# Patient Record
Sex: MALE | Race: WHITE | Employment: FULL TIME | URBAN - METROPOLITAN AREA
[De-identification: names, ages, dates, MRNs, and addresses within clinical notes are randomized per-mention and may not be internally consistent; named-entity substitution may affect disease eponyms.]

---

## 2017-04-13 ENCOUNTER — TELEPHONE (OUTPATIENT)
Dept: INTERNAL MEDICINE CLINIC | Facility: CLINIC | Age: 32
End: 2017-04-13

## 2017-04-13 DIAGNOSIS — C69.90 OCULAR MELANOMA, UNSPECIFIED LATERALITY (HCC): Primary | ICD-10-CM

## 2017-04-15 ENCOUNTER — APPOINTMENT (OUTPATIENT)
Dept: LAB | Facility: HOSPITAL | Age: 32
End: 2017-04-15
Attending: INTERNAL MEDICINE
Payer: COMMERCIAL

## 2017-04-15 DIAGNOSIS — C69.91 OCULAR MELANOMA, RIGHT (HCC): ICD-10-CM

## 2017-04-15 PROCEDURE — 36415 COLL VENOUS BLD VENIPUNCTURE: CPT

## 2017-04-15 PROCEDURE — 80053 COMPREHEN METABOLIC PANEL: CPT

## 2017-04-15 PROCEDURE — 83615 LACTATE (LD) (LDH) ENZYME: CPT

## 2017-04-17 ENCOUNTER — TELEPHONE (OUTPATIENT)
Dept: INTERNAL MEDICINE CLINIC | Facility: CLINIC | Age: 32
End: 2017-04-17

## 2017-04-17 NOTE — TELEPHONE ENCOUNTER
Pt is due for [facility] [place] [facility] wellness exam anytime this calendar year. Discussed in detail w/patient. Appt scheduled for May 8.

## 2017-04-18 NOTE — TELEPHONE ENCOUNTER
Referral is pending status with health plan. Pt will be notified once referral has been approved. Thank you, [employer].

## 2017-04-18 NOTE — TELEPHONE ENCOUNTER
Ref [id number] is an open ref to external opthamologist. Spoke to pt. He has an appt 5/1 to see Dr. Placido Rome in [place] for a ov. Pt has been seeing this specialist for past 3 years for eye cancer. This is a f/u visit. Please approve 2 visits.

## 2017-04-25 ENCOUNTER — OFFICE VISIT (OUTPATIENT)
Dept: HEMATOLOGY/ONCOLOGY | Facility: HOSPITAL | Age: 32
End: 2017-04-25
Attending: INTERNAL MEDICINE
Payer: COMMERCIAL

## 2017-04-25 VITALS
HEART RATE: 81 BPM | HEIGHT: 72 IN | RESPIRATION RATE: 16 BRPM | WEIGHT: 215 LBS | BODY MASS INDEX: 29.12 KG/M2 | DIASTOLIC BLOOD PRESSURE: 83 MMHG | SYSTOLIC BLOOD PRESSURE: 112 MMHG | TEMPERATURE: 98 F

## 2017-04-25 DIAGNOSIS — C69.91 OCULAR MELANOMA, RIGHT (HCC): Primary | ICD-10-CM

## 2017-04-25 PROCEDURE — 99213 OFFICE O/P EST LOW 20 MIN: CPT | Performed by: INTERNAL MEDICINE

## 2017-04-25 PROCEDURE — 99212 OFFICE O/P EST SF 10 MIN: CPT | Performed by: INTERNAL MEDICINE

## 2017-04-25 NOTE — PROGRESS NOTES
HPI       Nilda Whitney is a 28year old male here for f/u of Ocular melanoma, right (hcc)  (primary encounter diagnosis)     Patient here for f/u. Going to [place] for f/u with Optho in a couple of weeks.      He did have a decline in vision in that Current Outpatient Prescriptions:  Atropine Sulfate 1 % Ophthalmic Solution Place 2 drops into the right eye every 7 days.    Disp:  Rfl: 5   prednisoLONE acetate (PRED FORTE) 1 % Ophthalmic Suspension Place 2 drops into the right eye every 7 days Oropharynx is clear and moist.   Eyes: Conjunctivae and EOM are normal. Right pupil is not reactive. Pupils are unequal (R pupil dilated and fixed). Neck: No tracheal deviation present. No thyromegaly present.    Cardiovascular: Normal rate, regular rhyth guidelines. No specific guidelines available for occular melanoma. Will f/u every 6 months with H+P with LFTs. Given that occular melanoma is higher risk than skin, will have MRI of the Abd every year. Surveillance year 3 due in Oct 2017.      D Lymphocytes %          42   Monocytes %          9   Eosinophils %          2   Basophils %          1   Neutrophils Absolute      1.8-7.7 K/UL    3.1   Lymphocytes Absolute      1.0-4.0 K/UL    2.7   Monocytes Absolute      0.0-1.0 K/UL    0.6   Eosinop

## 2017-05-10 ENCOUNTER — OFFICE VISIT (OUTPATIENT)
Dept: INTERNAL MEDICINE CLINIC | Facility: CLINIC | Age: 32
End: 2017-05-10

## 2017-05-10 VITALS
SYSTOLIC BLOOD PRESSURE: 112 MMHG | BODY MASS INDEX: 28.48 KG/M2 | HEIGHT: 72 IN | DIASTOLIC BLOOD PRESSURE: 74 MMHG | WEIGHT: 210.31 LBS | HEART RATE: 106 BPM

## 2017-05-10 DIAGNOSIS — Z00.00 ANNUAL PHYSICAL EXAM: Primary | ICD-10-CM

## 2017-05-10 PROCEDURE — G0438 PPPS, INITIAL VISIT: HCPCS | Performed by: INTERNAL MEDICINE

## 2017-05-10 PROCEDURE — 99395 PREV VISIT EST AGE 18-39: CPT | Performed by: INTERNAL MEDICINE

## 2017-05-10 NOTE — PROGRESS NOTES
HPI:    Patient ID: Mikey White is a 28year old male. HPI     Annual Physical Exam  Patient with PMHx ocular melanoma presents for an annual physical exam. Patient states he has one radiation treatment left at [facility]. Denies CP or SOB on exertion.  P normal.   Left Ear: Hearing and external ear normal.   Nose: Nose normal.   Mouth/Throat: Oropharynx is clear and moist. No oropharyngeal exudate. Eyes: Conjunctivae are normal.   Neck: Normal range of motion. Neck supple.    Cardiovascular: Normal rate, performance and is accurate and complete.   Chantal Gould MD, 5/10/2017, 5:22 PM

## 2017-10-21 ENCOUNTER — HOSPITAL ENCOUNTER (OUTPATIENT)
Dept: MRI IMAGING | Facility: HOSPITAL | Age: 32
Discharge: HOME | End: 2017-10-21
Attending: INTERNAL MEDICINE
Payer: COMMERCIAL

## 2017-10-21 ENCOUNTER — APPOINTMENT (OUTPATIENT)
Dept: LAB | Facility: HOSPITAL | Age: 32
End: 2017-10-21
Attending: INTERNAL MEDICINE
Payer: COMMERCIAL

## 2017-10-21 DIAGNOSIS — C69.91 OCULAR MELANOMA, RIGHT (HCC): ICD-10-CM

## 2017-10-21 PROCEDURE — 80053 COMPREHEN METABOLIC PANEL: CPT

## 2017-10-21 PROCEDURE — 74183 MRI ABD W/O CNTR FLWD CNTR: CPT | Performed by: INTERNAL MEDICINE

## 2017-10-21 PROCEDURE — 82565 ASSAY OF CREATININE: CPT

## 2017-10-21 PROCEDURE — 83615 LACTATE (LD) (LDH) ENZYME: CPT

## 2017-10-21 PROCEDURE — A9575 INJ GADOTERATE MEGLUMI 0.1ML: HCPCS | Performed by: INTERNAL MEDICINE

## 2017-10-21 PROCEDURE — 36415 COLL VENOUS BLD VENIPUNCTURE: CPT

## 2017-11-07 ENCOUNTER — OFFICE VISIT (OUTPATIENT)
Dept: HEMATOLOGY/ONCOLOGY | Facility: HOSPITAL | Age: 32
End: 2017-11-07
Attending: INTERNAL MEDICINE
Payer: COMMERCIAL

## 2017-11-07 VITALS
HEART RATE: 71 BPM | WEIGHT: 212 LBS | RESPIRATION RATE: 16 BRPM | DIASTOLIC BLOOD PRESSURE: 74 MMHG | SYSTOLIC BLOOD PRESSURE: 115 MMHG | HEIGHT: 72 IN | BODY MASS INDEX: 28.71 KG/M2 | TEMPERATURE: 98 F

## 2017-11-07 DIAGNOSIS — C69.91 MALIGNANT MELANOMA OF RIGHT EYE (HCC): Primary | ICD-10-CM

## 2017-11-07 PROCEDURE — 99213 OFFICE O/P EST LOW 20 MIN: CPT | Performed by: INTERNAL MEDICINE

## 2017-11-07 PROCEDURE — 99212 OFFICE O/P EST SF 10 MIN: CPT | Performed by: INTERNAL MEDICINE

## 2017-11-07 NOTE — PROGRESS NOTES
HPI       Giuseppe Santamaria is a 33year old male here for f/u of Malignant melanoma of right eye (hcc)  (primary encounter diagnosis)     Patient here for f/u. Going to [place] for f/u with Optho in a month.      He did have a decline in vision in that e Prescriptions:  Atropine Sulfate 1 % Ophthalmic Solution Place 2 drops into the right eye every 7 days. Disp:  Rfl: 5   prednisoLONE acetate (PRED FORTE) 1 % Ophthalmic Suspension Place 2 drops into the right eye every 7 days.    Disp:  Rfl: 5     Allergi Ear: External ear normal.   Nose: Nose normal.   Mouth/Throat: Oropharynx is clear and moist.   Eyes: Conjunctivae and EOM are normal. Right pupil is not reactive. Pupils are unequal (R pupil dilated and fixed). Neck: No tracheal deviation present.  No th [place]. Surveillance as per NCCN guidelines. No specific guidelines available for occular melanoma. Will f/u every 6 months with H+P with LFTs. Given that occular melanoma is higher risk than skin, will have MRI of the Abd every year.   Yuli Devlin Intraluminal layering intrinsic T1 hyperintense contents may reflect concentrated bile. No intrahepatic or extrahepatic biliary dilatation is apparent. PANCREAS:      There is normal signal intensity.   No focal mass or main pancreatic duct dilatation is 1. 04 0.81   CALCIUM      8.5 - 10.5 mg/dL 9.4 9.5   ALT (SGPT)      17 - 63 U/L 25 32   AST (SGOT)      15 - 41 U/L 20 20   ALKALINE PHOSPHATASE      32 - 100 U/L 43 51   Total Bilirubin      0.3 - 1.2 mg/dL 1.1 0.9   TOTAL PROTEIN      5.9 - 8.4 g/dL 7.2

## 2017-11-10 ENCOUNTER — OFFICE VISIT (OUTPATIENT)
Dept: INTERNAL MEDICINE CLINIC | Facility: CLINIC | Age: 32
End: 2017-11-10

## 2017-11-10 VITALS
SYSTOLIC BLOOD PRESSURE: 114 MMHG | BODY MASS INDEX: 29.39 KG/M2 | HEIGHT: 72 IN | WEIGHT: 217 LBS | DIASTOLIC BLOOD PRESSURE: 74 MMHG | HEART RATE: 56 BPM

## 2017-11-10 DIAGNOSIS — H54.61 VISION LOSS OF RIGHT EYE: ICD-10-CM

## 2017-11-10 DIAGNOSIS — C69.91 MALIGNANT MELANOMA OF RIGHT EYE (HCC): Primary | ICD-10-CM

## 2017-11-10 PROCEDURE — 99212 OFFICE O/P EST SF 10 MIN: CPT | Performed by: INTERNAL MEDICINE

## 2017-11-10 PROCEDURE — 99213 OFFICE O/P EST LOW 20 MIN: CPT | Performed by: INTERNAL MEDICINE

## 2017-11-10 NOTE — PATIENT INSTRUCTIONS
ASSESSMENT/PLAN:   Malignant melanoma of right eye (hcc)  (primary encounter diagnosis)- in remission, follows with eye specialist in [place] and  Dr chan here at the He/Onc clinic  Vision loss of right eye- due to above

## 2017-11-10 NOTE — PROGRESS NOTES
HPI:    Patient ID: Candida Hunter is a 33year old male. HPI  pt comes in today for the first time in my office to establish care. Pt has his of occular Melanoma in remission has had treatment and surgery at [facility] in [place].  Follows with oncology   COMPLETE      Comment: Scanned to media tab 09-   Family History   Problem Relation Age of Onset   • Migraines Mother    • Breast Cancer Maternal Grandmother 76   • Glaucoma Neg    • Macular degeneration Neg    • Diabetes Neg       Social History: S prescriptions requested or ordered in this encounter    Imaging & Referrals:  None        #[id number]

## 2017-11-17 ENCOUNTER — TELEPHONE (OUTPATIENT)
Dept: INTERNAL MEDICINE CLINIC | Facility: CLINIC | Age: 32
End: 2017-11-17

## 2017-11-17 NOTE — TELEPHONE ENCOUNTER
Patient is following up on test results wants to know if you have received them. Also needing referral for Dr Risa Hamman in [place] referral from April all 3 appointments used. Has appointment December 11th.

## 2017-11-20 DIAGNOSIS — C69.91 MALIGNANT MELANOMA OF RIGHT EYE (HCC): Primary | ICD-10-CM

## 2018-05-18 ENCOUNTER — TELEPHONE (OUTPATIENT)
Dept: INTERNAL MEDICINE CLINIC | Facility: CLINIC | Age: 33
End: 2018-05-18

## 2018-05-18 DIAGNOSIS — C69.91 MALIGNANT MELANOMA OF RIGHT EYE (HCC): Primary | ICD-10-CM

## 2018-05-25 ENCOUNTER — APPOINTMENT (OUTPATIENT)
Dept: LAB | Facility: HOSPITAL | Age: 33
End: 2018-05-25
Attending: INTERNAL MEDICINE
Payer: COMMERCIAL

## 2018-05-25 DIAGNOSIS — C69.91 MALIGNANT MELANOMA OF RIGHT EYE (HCC): ICD-10-CM

## 2018-05-25 PROCEDURE — 80053 COMPREHEN METABOLIC PANEL: CPT

## 2018-05-25 PROCEDURE — 36415 COLL VENOUS BLD VENIPUNCTURE: CPT

## 2018-05-25 PROCEDURE — 83615 LACTATE (LD) (LDH) ENZYME: CPT

## 2018-06-04 ENCOUNTER — TELEPHONE (OUTPATIENT)
Dept: INTERNAL MEDICINE CLINIC | Facility: CLINIC | Age: 33
End: 2018-06-04

## 2018-06-04 DIAGNOSIS — C69.91 MALIGNANT MELANOMA OF RIGHT EYE (HCC): Primary | ICD-10-CM

## 2018-06-05 ENCOUNTER — OFFICE VISIT (OUTPATIENT)
Dept: HEMATOLOGY/ONCOLOGY | Facility: HOSPITAL | Age: 33
End: 2018-06-05
Attending: INTERNAL MEDICINE
Payer: COMMERCIAL

## 2018-06-05 ENCOUNTER — APPOINTMENT (OUTPATIENT)
Dept: LAB | Facility: HOSPITAL | Age: 33
End: 2018-06-05
Attending: INTERNAL MEDICINE
Payer: COMMERCIAL

## 2018-06-05 VITALS
BODY MASS INDEX: 29.26 KG/M2 | HEIGHT: 72 IN | HEART RATE: 72 BPM | WEIGHT: 216 LBS | SYSTOLIC BLOOD PRESSURE: 116 MMHG | TEMPERATURE: 98 F | RESPIRATION RATE: 16 BRPM | DIASTOLIC BLOOD PRESSURE: 63 MMHG

## 2018-06-05 DIAGNOSIS — C69.91 MALIGNANT MELANOMA OF RIGHT EYE (HCC): ICD-10-CM

## 2018-06-05 DIAGNOSIS — R17 SERUM TOTAL BILIRUBIN ELEVATED: Primary | ICD-10-CM

## 2018-06-05 DIAGNOSIS — R17 SERUM TOTAL BILIRUBIN ELEVATED: ICD-10-CM

## 2018-06-05 PROCEDURE — 36415 COLL VENOUS BLD VENIPUNCTURE: CPT

## 2018-06-05 PROCEDURE — 80076 HEPATIC FUNCTION PANEL: CPT

## 2018-06-05 PROCEDURE — 99214 OFFICE O/P EST MOD 30 MIN: CPT | Performed by: INTERNAL MEDICINE

## 2018-06-05 NOTE — PROGRESS NOTES
STELLA       Omar Smith is a 33year old male here for f/u of Malignant melanoma of right eye (hcc)     Patient here for f/u. Going to [place] for f/u with Optho in a month. Going every 6 months. He did have a decline in vision in that eye.   L eye Outpatient Prescriptions:  Atropine Sulfate 1 % Ophthalmic Solution Place 2 drops into the right eye every 7 days. Disp:  Rfl: 5   prednisoLONE acetate (PRED FORTE) 1 % Ophthalmic Suspension Place 2 drops into the right eye every 7 days.    Disp:  Rfl: 5 Ear: External ear normal.   Nose: Nose normal.   Mouth/Throat: Oropharynx is clear and moist.   Eyes: Conjunctivae and EOM are normal. Right pupil is not reactive. Pupils are unequal (R pupil dilated and fixed). Neck: No tracheal deviation present.  No th NCCN guidelines. No specific guidelines available for occular melanoma. Will f/u every 6 months with H+P with LFTs. Slight elevation of his bilirubin, he had this back in 2015 and repeated and back to normal.  Will have repeat LFTs today.     Given t Non-      >=60 >60 >60 >60   GFR, -American      >=60 >60 >60 >60   Patient Fasting?        Yes     LDH      98 - 192 U/L 120 144 126

## 2018-11-17 ENCOUNTER — APPOINTMENT (OUTPATIENT)
Dept: LAB | Facility: HOSPITAL | Age: 33
End: 2018-11-17
Attending: INTERNAL MEDICINE
Payer: COMMERCIAL

## 2018-11-17 ENCOUNTER — HOSPITAL ENCOUNTER (OUTPATIENT)
Dept: MRI IMAGING | Facility: HOSPITAL | Age: 33
Discharge: HOME | End: 2018-11-17
Attending: INTERNAL MEDICINE
Payer: COMMERCIAL

## 2018-11-17 ENCOUNTER — TELEPHONE (OUTPATIENT)
Dept: HEMATOLOGY/ONCOLOGY | Facility: HOSPITAL | Age: 33
End: 2018-11-17

## 2018-11-17 DIAGNOSIS — C69.90 MALIGNANT MELANOMA OF EYE, UNSPECIFIED LATERALITY (HCC): Primary | ICD-10-CM

## 2018-11-17 DIAGNOSIS — C69.91 MALIGNANT MELANOMA OF RIGHT EYE (HCC): ICD-10-CM

## 2018-11-17 DIAGNOSIS — C69.90 MALIGNANT MELANOMA OF EYE, UNSPECIFIED LATERALITY (HCC): ICD-10-CM

## 2018-11-17 PROCEDURE — 80053 COMPREHEN METABOLIC PANEL: CPT

## 2018-11-17 PROCEDURE — 74183 MRI ABD W/O CNTR FLWD CNTR: CPT | Performed by: INTERNAL MEDICINE

## 2018-11-17 PROCEDURE — 36415 COLL VENOUS BLD VENIPUNCTURE: CPT

## 2018-11-17 PROCEDURE — A9575 INJ GADOTERATE MEGLUMI 0.1ML: HCPCS | Performed by: INTERNAL MEDICINE

## 2018-11-17 PROCEDURE — 82565 ASSAY OF CREATININE: CPT

## 2018-11-17 PROCEDURE — 83615 LACTATE (LD) (LDH) ENZYME: CPT

## 2018-11-17 NOTE — TELEPHONE ENCOUNTER
Patient and his wife called very upset and frustrated that lab orders were not available to lab personal this morning when he went for labs. Pt's standing orders had .  His appt is ten days from now on the , but pt and wife are angry as they nathalie

## 2018-11-26 DIAGNOSIS — C69.91 MALIGNANT MELANOMA OF RIGHT EYE (HCC): Primary | ICD-10-CM

## 2018-11-27 ENCOUNTER — OFFICE VISIT (OUTPATIENT)
Dept: HEMATOLOGY/ONCOLOGY | Facility: HOSPITAL | Age: 33
End: 2018-11-27
Attending: INTERNAL MEDICINE
Payer: COMMERCIAL

## 2018-11-27 VITALS
WEIGHT: 222 LBS | SYSTOLIC BLOOD PRESSURE: 126 MMHG | DIASTOLIC BLOOD PRESSURE: 62 MMHG | TEMPERATURE: 98 F | RESPIRATION RATE: 16 BRPM | HEART RATE: 77 BPM | BODY MASS INDEX: 30.07 KG/M2 | HEIGHT: 72 IN

## 2018-11-27 DIAGNOSIS — C69.91 MALIGNANT MELANOMA OF RIGHT EYE (HCC): ICD-10-CM

## 2018-11-27 PROCEDURE — 99213 OFFICE O/P EST LOW 20 MIN: CPT | Performed by: INTERNAL MEDICINE

## 2018-11-27 NOTE — PROGRESS NOTES
STELLA       Diamante Quintanilla is a 29year old male here for f/u of Malignant melanoma of right eye (hcc)     Patient here for f/u. Going to [place] for f/u with Optho yearly now in June. He did have a decline in vision in that eye.   L eye vision is go Laterality Date   • ELECTROCARDIOGRAM, COMPLETE  09-    Scanned to media tab 09-     Social History    Socioeconomic History      Marital status:       Spouse name: Not on file      Number of children: Not on file      Years of educati 95.4 kg (210 lb 4.8 oz)  04/25/17 : 97.5 kg (215 lb)    PHYSICAL EXAM:    Physical Exam   Constitutional: He appears well-developed and well-nourished. HENT:   Head: Normocephalic and atraumatic.    Right Ear: External ear normal.   Left Ear: External ear as per NCCN guidelines. No specific guidelines available for occular melanoma. 5 yrs post treatment. Given that occular melanoma is higher risk than skin, will have MRI of the Abd every year. Now on surveillance every 12 months.     Dermatol [address], 10/10/2015, 8:50. [facility], [facility], [facility] 59, 10/18/2014, 11:22. [facility] 59, 9/06/2013, 11:39.   [address] [place] [address] Dr Golden, MRI ABD

## 2018-12-04 ENCOUNTER — APPOINTMENT (OUTPATIENT)
Dept: HEMATOLOGY/ONCOLOGY | Facility: HOSPITAL | Age: 33
End: 2018-12-04
Attending: INTERNAL MEDICINE
Payer: COMMERCIAL

## 2019-05-17 ENCOUNTER — TELEPHONE (OUTPATIENT)
Dept: INTERNAL MEDICINE CLINIC | Facility: CLINIC | Age: 34
End: 2019-05-17

## 2019-05-17 DIAGNOSIS — C69.91: Primary | ICD-10-CM

## 2019-05-17 NOTE — TELEPHONE ENCOUNTER
Patient is asking for a referral to see Dr. Shaunna Heredia eye doctor  for check up follow and imaging

## 2019-05-20 NOTE — TELEPHONE ENCOUNTER
Spoke to patient to inform him that referral is ready. Patient states to pick it up from [address] [place] [address] office tomorrow.

## 2019-05-28 ENCOUNTER — TELEPHONE (OUTPATIENT)
Dept: INTERNAL MEDICINE CLINIC | Facility: CLINIC | Age: 34
End: 2019-05-28

## 2019-05-28 NOTE — TELEPHONE ENCOUNTER
Called Edmond Alaniz, she stated to sent most recent office visit from PCP.      Verbally understood, will sent most recent visit with pcp    Shin

## 2019-05-28 NOTE — TELEPHONE ENCOUNTER
Per Je Medley referred to an eye doctor not in network, why ?   can patient see in network provider?

## 2019-05-28 NOTE — TELEPHONE ENCOUNTER
Received referral request [facility]#[id number] , can we use an in network provider?     Thanks,    Jayy Wilosn

## 2019-06-27 ENCOUNTER — MED REC SCAN ONLY (OUTPATIENT)
Dept: INTERNAL MEDICINE CLINIC | Facility: CLINIC | Age: 34
End: 2019-06-27

## 2019-10-19 ENCOUNTER — APPOINTMENT (OUTPATIENT)
Dept: LAB | Facility: HOSPITAL | Age: 34
End: 2019-10-19
Attending: INTERNAL MEDICINE
Payer: COMMERCIAL

## 2019-10-19 ENCOUNTER — HOSPITAL ENCOUNTER (OUTPATIENT)
Dept: MRI IMAGING | Facility: HOSPITAL | Age: 34
Discharge: HOME | End: 2019-10-19
Attending: INTERNAL MEDICINE
Payer: COMMERCIAL

## 2019-10-19 DIAGNOSIS — C69.91 MALIGNANT MELANOMA OF RIGHT EYE (HCC): ICD-10-CM

## 2019-10-19 PROCEDURE — 83615 LACTATE (LD) (LDH) ENZYME: CPT

## 2019-10-19 PROCEDURE — 82565 ASSAY OF CREATININE: CPT

## 2019-10-19 PROCEDURE — A9575 INJ GADOTERATE MEGLUMI 0.1ML: HCPCS | Performed by: INTERNAL MEDICINE

## 2019-10-19 PROCEDURE — 80053 COMPREHEN METABOLIC PANEL: CPT

## 2019-10-19 PROCEDURE — 36415 COLL VENOUS BLD VENIPUNCTURE: CPT

## 2019-10-19 PROCEDURE — 74183 MRI ABD W/O CNTR FLWD CNTR: CPT | Performed by: INTERNAL MEDICINE

## 2019-11-25 DIAGNOSIS — C69.91 MALIGNANT MELANOMA OF RIGHT EYE (HCC): Primary | ICD-10-CM

## 2019-11-26 ENCOUNTER — OFFICE VISIT (OUTPATIENT)
Dept: HEMATOLOGY/ONCOLOGY | Facility: HOSPITAL | Age: 34
End: 2019-11-26
Attending: INTERNAL MEDICINE
Payer: COMMERCIAL

## 2019-11-26 VITALS
HEIGHT: 72 IN | SYSTOLIC BLOOD PRESSURE: 114 MMHG | RESPIRATION RATE: 16 BRPM | BODY MASS INDEX: 30.88 KG/M2 | HEART RATE: 69 BPM | DIASTOLIC BLOOD PRESSURE: 67 MMHG | OXYGEN SATURATION: 96 % | TEMPERATURE: 98 F | WEIGHT: 228 LBS

## 2019-11-26 DIAGNOSIS — C69.91 MALIGNANT MELANOMA OF RIGHT EYE (HCC): Primary | ICD-10-CM

## 2019-11-26 PROCEDURE — 99213 OFFICE O/P EST LOW 20 MIN: CPT | Performed by: INTERNAL MEDICINE

## 2019-11-26 NOTE — PROGRESS NOTES
STELLA     Luther Krabbe is a 36year old male here for f/u of Malignant melanoma of right eye (hcc)  (primary encounter diagnosis)     Patient here for f/u. Going to [place] for f/u with Optho yearly now in June. Told no further need for eye drops. Grandmother 68   • Glaucoma Neg    • Macular degeneration Neg    • Diabetes Neg        /67 (BP Location: Left arm, Patient Position: Sitting, Cuff Size: large)   Pulse 69   Temp 98.3 °F (36.8 °C) (Oral)   Resp 16   Ht 1.829 m (6')   Wt 103.4 kg (228 encounter. Results From Past 48 Hours:  No results found for this or any previous visit (from the past 48 hour(s)). Imaging & Referrals:  None   No orders of the defined types were placed in this encounter.      Component      Latest Ref Rng & Unit suspected pathology. Images were obtained both before and after intravenous gadolinium   injection. FINDINGS:          LUNG BASES:  No significant signal abnormality is identified. LIVER:  No focal hepatic mass is seen.   There is no hepatic steat

## 2019-11-27 ENCOUNTER — TELEPHONE (OUTPATIENT)
Dept: INTERNAL MEDICINE CLINIC | Facility: CLINIC | Age: 34
End: 2019-11-27

## 2019-11-27 DIAGNOSIS — C69.91 MALIGNANT MELANOMA OF RIGHT EYE (HCC): Primary | ICD-10-CM

## 2019-12-06 ENCOUNTER — TELEPHONE (OUTPATIENT)
Dept: INTERNAL MEDICINE CLINIC | Facility: CLINIC | Age: 34
End: 2019-12-06

## 2019-12-06 NOTE — TELEPHONE ENCOUNTER
Spoke to patient, he will call back to schedule an appointment for a follow up with Dr. Elsa Mcpherson

## 2019-12-12 ENCOUNTER — OFFICE VISIT (OUTPATIENT)
Dept: INTERNAL MEDICINE CLINIC | Facility: CLINIC | Age: 34
End: 2019-12-12

## 2019-12-12 VITALS
WEIGHT: 226 LBS | HEART RATE: 64 BPM | DIASTOLIC BLOOD PRESSURE: 79 MMHG | HEIGHT: 72 IN | BODY MASS INDEX: 30.61 KG/M2 | SYSTOLIC BLOOD PRESSURE: 120 MMHG | OXYGEN SATURATION: 98 % | TEMPERATURE: 98 F | RESPIRATION RATE: 17 BRPM

## 2019-12-12 DIAGNOSIS — R05.9 COUGH: ICD-10-CM

## 2019-12-12 DIAGNOSIS — H54.61 VISION LOSS OF RIGHT EYE: ICD-10-CM

## 2019-12-12 DIAGNOSIS — B00.1 HERPES LABIALIS: ICD-10-CM

## 2019-12-12 DIAGNOSIS — Z00.00 ANNUAL PHYSICAL EXAM: Primary | ICD-10-CM

## 2019-12-12 DIAGNOSIS — C69.91 MALIGNANT MELANOMA OF RIGHT EYE (HCC): ICD-10-CM

## 2019-12-12 DIAGNOSIS — J02.9 SORE THROAT: ICD-10-CM

## 2019-12-12 PROCEDURE — 99395 PREV VISIT EST AGE 18-39: CPT | Performed by: INTERNAL MEDICINE

## 2019-12-12 RX ORDER — AZITHROMYCIN 250 MG/1
TABLET, FILM COATED ORAL
Qty: 6 TABLET | Refills: 0 | Status: SHIPPED | OUTPATIENT
Start: 2019-12-12 | End: 2021-09-29

## 2019-12-12 RX ORDER — BENZONATATE 100 MG/1
100 CAPSULE ORAL 3 TIMES DAILY PRN
Qty: 20 CAPSULE | Refills: 0 | Status: SHIPPED | OUTPATIENT
Start: 2019-12-12 | End: 2019-12-19

## 2019-12-12 RX ORDER — VALACYCLOVIR HYDROCHLORIDE 1 G/1
TABLET, FILM COATED ORAL
Qty: 30 TABLET | Refills: 2 | Status: SHIPPED | OUTPATIENT
Start: 2019-12-12 | End: 2021-09-29

## 2019-12-12 NOTE — PROGRESS NOTES
HPI:    Patient ID: Wendy Dunne is a 36year old male. HPI  Patient comes in for annual physical exam.  Is been sick for a week now sore throat cough congestion not getting better.   Patient with history of right thigh malignant melanoma with vision lo Ocular melanoma ([facility] [address] 09/06/2013   • Retinal detachment 2013    Macula -on bullous retinal detachment , OD   • Torn ACL       Past Surgical History:   Procedure Laterality Date   • ELECTROCARDIOGRAM, COMPLETE  09-    Scanned to media tab 09- hematology oncology is [facility] also will refer to Derm  Vision loss of right eye- as above  Sore throat- will cover with ab, take as prescribed   Cough - will add cough med   Herpes labialis- take the med 1 tab bid  2 days  as need at he first

## 2020-11-17 ENCOUNTER — APPOINTMENT (OUTPATIENT)
Dept: HEMATOLOGY/ONCOLOGY | Facility: HOSPITAL | Age: 35
End: 2020-11-17
Attending: INTERNAL MEDICINE
Payer: COMMERCIAL

## 2021-03-17 ENCOUNTER — OFFICE VISIT (OUTPATIENT)
Dept: INTERNAL MEDICINE CLINIC | Facility: CLINIC | Age: 36
End: 2021-03-17

## 2021-03-17 VITALS
SYSTOLIC BLOOD PRESSURE: 112 MMHG | BODY MASS INDEX: 32.73 KG/M2 | HEART RATE: 83 BPM | OXYGEN SATURATION: 98 % | DIASTOLIC BLOOD PRESSURE: 74 MMHG | WEIGHT: 255 LBS | TEMPERATURE: 98 F | HEIGHT: 74 IN

## 2021-03-17 DIAGNOSIS — Z00.00 ANNUAL PHYSICAL EXAM: Primary | ICD-10-CM

## 2021-03-17 DIAGNOSIS — H54.61 VISION LOSS OF RIGHT EYE: ICD-10-CM

## 2021-03-17 DIAGNOSIS — R79.89 ELEVATED LFTS: ICD-10-CM

## 2021-03-17 DIAGNOSIS — C69.91 MALIGNANT MELANOMA OF RIGHT EYE (HCC): ICD-10-CM

## 2021-03-17 LAB
ALBUMIN SERPL-MCNC: 4.8 G/DL (ref 3.4–5)
ALBUMIN/GLOB SERPL: 1.5 {RATIO} (ref 1–2)
ALP LIVER SERPL-CCNC: 51 U/L
ALT SERPL-CCNC: 128 U/L
ANION GAP SERPL CALC-SCNC: 4 MMOL/L (ref 0–18)
AST SERPL-CCNC: 40 U/L (ref 15–37)
BASOPHILS # BLD AUTO: 0.06 X10(3) UL (ref 0–0.2)
BASOPHILS NFR BLD AUTO: 0.8 %
BILIRUB SERPL-MCNC: 0.9 MG/DL (ref 0.1–2)
BILIRUB UR QL: NEGATIVE
BUN BLD-MCNC: 16 MG/DL (ref 7–18)
BUN/CREAT SERPL: 15.1 (ref 10–20)
CALCIUM BLD-MCNC: 9.5 MG/DL (ref 8.5–10.1)
CHLORIDE SERPL-SCNC: 105 MMOL/L (ref 98–112)
CHOLEST SMN-MCNC: 223 MG/DL (ref ?–200)
CLARITY UR: CLEAR
CO2 SERPL-SCNC: 29 MMOL/L (ref 21–32)
COLOR UR: YELLOW
CREAT BLD-MCNC: 1.06 MG/DL
DEPRECATED RDW RBC AUTO: 36.2 FL (ref 35.1–46.3)
EOSINOPHIL # BLD AUTO: 0.17 X10(3) UL (ref 0–0.7)
EOSINOPHIL NFR BLD AUTO: 2.3 %
ERYTHROCYTE [DISTWIDTH] IN BLOOD BY AUTOMATED COUNT: 12 % (ref 11–15)
GLOBULIN PLAS-MCNC: 3.3 G/DL (ref 2.8–4.4)
GLUCOSE BLD-MCNC: 89 MG/DL (ref 70–99)
GLUCOSE UR-MCNC: NEGATIVE MG/DL
HCT VFR BLD AUTO: 47.1 %
HDLC SERPL-MCNC: 26 MG/DL (ref 40–59)
HGB BLD-MCNC: 16.3 G/DL
HGB UR QL STRIP.AUTO: NEGATIVE
IMM GRANULOCYTES # BLD AUTO: 0.03 X10(3) UL (ref 0–1)
IMM GRANULOCYTES NFR BLD: 0.4 %
KETONES UR-MCNC: NEGATIVE MG/DL
LDH SERPL L TO P-CCNC: 198 U/L
LDLC SERPL CALC-MCNC: 145 MG/DL (ref ?–100)
LEUKOCYTE ESTERASE UR QL STRIP.AUTO: NEGATIVE
LYMPHOCYTES # BLD AUTO: 2.91 X10(3) UL (ref 1–4)
LYMPHOCYTES NFR BLD AUTO: 39.7 %
M PROTEIN MFR SERPL ELPH: 8.1 G/DL (ref 6.4–8.2)
MCH RBC QN AUTO: 29 PG (ref 26–34)
MCHC RBC AUTO-ENTMCNC: 34.6 G/DL (ref 31–37)
MCV RBC AUTO: 83.7 FL
MONOCYTES # BLD AUTO: 0.73 X10(3) UL (ref 0.1–1)
MONOCYTES NFR BLD AUTO: 10 %
NEUTROPHILS # BLD AUTO: 3.43 X10 (3) UL (ref 1.5–7.7)
NEUTROPHILS # BLD AUTO: 3.43 X10(3) UL (ref 1.5–7.7)
NEUTROPHILS NFR BLD AUTO: 46.8 %
NITRITE UR QL STRIP.AUTO: NEGATIVE
NONHDLC SERPL-MCNC: 197 MG/DL (ref ?–130)
OSMOLALITY SERPL CALC.SUM OF ELEC: 287 MOSM/KG (ref 275–295)
PATIENT FASTING Y/N/NP: YES
PATIENT FASTING Y/N/NP: YES
PH UR: 6 [PH] (ref 5–8)
PLATELET # BLD AUTO: 294 10(3)UL (ref 150–450)
POTASSIUM SERPL-SCNC: 4 MMOL/L (ref 3.5–5.1)
PROT UR-MCNC: NEGATIVE MG/DL
RBC # BLD AUTO: 5.63 X10(6)UL
SODIUM SERPL-SCNC: 138 MMOL/L (ref 136–145)
SP GR UR STRIP: 1.02 (ref 1–1.03)
TRIGL SERPL-MCNC: 260 MG/DL (ref 30–149)
TSI SER-ACNC: 1.73 MIU/ML (ref 0.36–3.74)
UROBILINOGEN UR STRIP-ACNC: <2
VLDLC SERPL CALC-MCNC: 52 MG/DL (ref 0–30)
WBC # BLD AUTO: 7.3 X10(3) UL (ref 4–11)

## 2021-03-17 PROCEDURE — 3078F DIAST BP <80 MM HG: CPT | Performed by: INTERNAL MEDICINE

## 2021-03-17 PROCEDURE — 36415 COLL VENOUS BLD VENIPUNCTURE: CPT | Performed by: INTERNAL MEDICINE

## 2021-03-17 PROCEDURE — 3008F BODY MASS INDEX DOCD: CPT | Performed by: INTERNAL MEDICINE

## 2021-03-17 PROCEDURE — 99395 PREV VISIT EST AGE 18-39: CPT | Performed by: INTERNAL MEDICINE

## 2021-03-17 PROCEDURE — G0438 PPPS, INITIAL VISIT: HCPCS | Performed by: INTERNAL MEDICINE

## 2021-03-17 PROCEDURE — 3074F SYST BP LT 130 MM HG: CPT | Performed by: INTERNAL MEDICINE

## 2021-03-18 LAB
DEPRECATED HBV CORE AB SER IA-ACNC: 390.4 NG/ML
TRANSFERRIN SERPL-MCNC: 244 MG/DL (ref 200–360)

## 2021-03-20 NOTE — PROGRESS NOTES
HPI/Subjective:     Patient ID: Madelin Malhotra is a 30year old male. HPI  Patient comes in today for annual physical overall doing okay denies any complaints. History/Other:   Review of Systems   Constitutional: Negative.   Negative for fatigue and feve degeneration Neg    • Diabetes Neg       Social History: Social History    Tobacco Use      Smoking status: Never Smoker      Smokeless tobacco: Never Used    Alcohol use: Yes      Comment: socially (no per NG ECD)    Drug use: No       Objective:   Physic prescriptions requested or ordered in this encounter       Imaging & Referrals:  ONCOLOGY/HEMATOLOGY - INTERNAL

## 2021-03-26 ENCOUNTER — IMMUNIZATION (OUTPATIENT)
Dept: LAB | Age: 36
End: 2021-03-26
Attending: HOSPITALIST
Payer: COMMERCIAL

## 2021-03-26 DIAGNOSIS — Z23 NEED FOR VACCINATION: Primary | ICD-10-CM

## 2021-03-26 PROCEDURE — 0001A SARSCOV2 VAC 30MCG/0.3ML IM: CPT

## 2021-04-16 ENCOUNTER — IMMUNIZATION (OUTPATIENT)
Dept: LAB | Age: 36
End: 2021-04-16
Attending: HOSPITALIST
Payer: COMMERCIAL

## 2021-04-16 DIAGNOSIS — Z23 NEED FOR VACCINATION: Primary | ICD-10-CM

## 2021-04-16 PROCEDURE — 0002A SARSCOV2 VAC 30MCG/0.3ML IM: CPT

## 2021-04-27 ENCOUNTER — TELEPHONE (OUTPATIENT)
Dept: INTERNAL MEDICINE CLINIC | Facility: CLINIC | Age: 36
End: 2021-04-27

## 2021-04-27 NOTE — TELEPHONE ENCOUNTER
Spoke to patient, states he is following up on Referral [id number]. He has seen this specialist for years with his HMO insurance. Call transferred to Managed care.      Type Date User Summary Attachment   Prior Authorization Confirmed/Denied/NA 04/15/2021 12:

## 2021-05-11 ENCOUNTER — MED REC SCAN ONLY (OUTPATIENT)
Dept: INTERNAL MEDICINE CLINIC | Facility: CLINIC | Age: 36
End: 2021-05-11

## 2021-09-16 ENCOUNTER — TELEPHONE (OUTPATIENT)
Dept: INTERNAL MEDICINE CLINIC | Facility: CLINIC | Age: 36
End: 2021-09-16

## 2021-09-16 ENCOUNTER — TELEPHONE (OUTPATIENT)
Dept: HEMATOLOGY/ONCOLOGY | Facility: HOSPITAL | Age: 36
End: 2021-09-16

## 2021-09-16 DIAGNOSIS — C69.91 MALIGNANT MELANOMA OF RIGHT EYE (HCC): Primary | ICD-10-CM

## 2021-09-16 NOTE — TELEPHONE ENCOUNTER
Patient calling and would to make appointment for a F/U with Dr. Yelitza Tony.    Patient has not seen Dr in 10 months  And wanted to know if any tests need to be done before a F/U visit,    Plz advise

## 2021-09-17 NOTE — TELEPHONE ENCOUNTER
Good Afternoon Dr Iris Escobar    Please see message below and generate referral if you agree with patient plan of care    Thank you,  CYNTHIA GALVIN

## 2021-09-28 ENCOUNTER — HOSPITAL ENCOUNTER (EMERGENCY)
Facility: HOSPITAL | Age: 36
Discharge: HOME | End: 2021-09-28
Attending: EMERGENCY MEDICINE
Payer: COMMERCIAL

## 2021-09-28 ENCOUNTER — APPOINTMENT (OUTPATIENT)
Dept: GENERAL RADIOLOGY | Facility: HOSPITAL | Age: 36
End: 2021-09-28
Attending: EMERGENCY MEDICINE
Payer: COMMERCIAL

## 2021-09-28 VITALS
RESPIRATION RATE: 16 BRPM | BODY MASS INDEX: 30.71 KG/M2 | DIASTOLIC BLOOD PRESSURE: 79 MMHG | HEIGHT: 75 IN | SYSTOLIC BLOOD PRESSURE: 127 MMHG | OXYGEN SATURATION: 97 % | HEART RATE: 79 BPM | TEMPERATURE: 98 F | WEIGHT: 247 LBS

## 2021-09-28 DIAGNOSIS — S52.124A CLOSED NONDISPLACED FRACTURE OF HEAD OF RIGHT RADIUS, INITIAL ENCOUNTER: Primary | ICD-10-CM

## 2021-09-28 PROCEDURE — 99284 EMERGENCY DEPT VISIT MOD MDM: CPT

## 2021-09-28 PROCEDURE — 73080 X-RAY EXAM OF ELBOW: CPT | Performed by: EMERGENCY MEDICINE

## 2021-09-28 RX ORDER — HYDROCODONE BITARTRATE AND ACETAMINOPHEN 5; 325 MG/1; MG/1
1 TABLET ORAL ONCE
Status: COMPLETED | OUTPATIENT
Start: 2021-09-28 | End: 2021-09-28

## 2021-09-28 RX ORDER — HYDROCODONE BITARTRATE AND ACETAMINOPHEN 5; 325 MG/1; MG/1
1-2 TABLET ORAL EVERY 6 HOURS PRN
Qty: 10 TABLET | Refills: 0 | Status: SHIPPED | OUTPATIENT
Start: 2021-09-28 | End: 2021-10-05

## 2021-09-28 NOTE — ED PROVIDER NOTES
Patient Seen in: [facility] AND [facility] Emergency Department      History   Patient presents with:  Elbow Injury    Stated Complaint: fall - right arm injury    Subjective:   HPI  Patient is a 23-year-old male presenting with right elbow injury.   Patient repo to light. Cardiovascular:      Rate and Rhythm: Normal rate and regular rhythm. Pulmonary:      Effort: Pulmonary effort is normal. No respiratory distress. Breath sounds: Normal breath sounds. Abdominal:      General: There is no distension. Disposition and Plan     Clinical Impression:  Closed nondisplaced fracture of head of right radius, initial encounter  (primary encounter diagnosis)     Disposition:  Discharge  9/28/2021  6:06 am    Follow-up:  Clair Stokes DO  [address] [place] [address]

## 2021-09-29 ENCOUNTER — TELEPHONE (OUTPATIENT)
Dept: ORTHOPEDICS CLINIC | Facility: CLINIC | Age: 36
End: 2021-09-29

## 2021-09-29 ENCOUNTER — OFFICE VISIT (OUTPATIENT)
Dept: INTERNAL MEDICINE CLINIC | Facility: CLINIC | Age: 36
End: 2021-09-29

## 2021-09-29 VITALS
HEART RATE: 76 BPM | SYSTOLIC BLOOD PRESSURE: 124 MMHG | OXYGEN SATURATION: 99 % | DIASTOLIC BLOOD PRESSURE: 80 MMHG | BODY MASS INDEX: 31.58 KG/M2 | RESPIRATION RATE: 18 BRPM | TEMPERATURE: 98 F | WEIGHT: 254 LBS | HEIGHT: 75 IN

## 2021-09-29 DIAGNOSIS — S52.124A CLOSED NONDISPLACED FRACTURE OF HEAD OF RIGHT RADIUS, INITIAL ENCOUNTER: Primary | ICD-10-CM

## 2021-09-29 PROCEDURE — 3079F DIAST BP 80-89 MM HG: CPT | Performed by: INTERNAL MEDICINE

## 2021-09-29 PROCEDURE — 3008F BODY MASS INDEX DOCD: CPT | Performed by: INTERNAL MEDICINE

## 2021-09-29 PROCEDURE — 99213 OFFICE O/P EST LOW 20 MIN: CPT | Performed by: INTERNAL MEDICINE

## 2021-09-29 PROCEDURE — 3074F SYST BP LT 130 MM HG: CPT | Performed by: INTERNAL MEDICINE

## 2021-09-29 NOTE — TELEPHONE ENCOUNTER
S/w Dr. Hyun Joaquin and he wants pt to be added on to his schedule on 10/1. Called pt LMTCB. If pt CB please offer him appt on 10/1.  Ok to [facility] at [facility].

## 2021-09-29 NOTE — PROGRESS NOTES
Subjective:   Patient ID: Naomi Kim is a 29year old male.     HPI  Patient had falling yesterday landed on his right arm initially did not think much of it but then at night started having a lot of pain went to ER was found to have right radial fractur Head: Normocephalic and atraumatic. Right Ear: External ear normal.      Left Ear: External ear normal.      Nose: Nose normal.   Eyes:      Conjunctiva/sclera: Conjunctivae normal.      Pupils: Pupils are equal, round, and reactive to light.    Cardio

## 2021-10-01 ENCOUNTER — OFFICE VISIT (OUTPATIENT)
Dept: ORTHOPEDICS CLINIC | Facility: CLINIC | Age: 36
End: 2021-10-01

## 2021-10-01 VITALS — HEIGHT: 75 IN | WEIGHT: 245 LBS | BODY MASS INDEX: 30.46 KG/M2

## 2021-10-01 DIAGNOSIS — S52.134A CLOSED NONDISPLACED FRACTURE OF NECK OF RIGHT RADIUS, INITIAL ENCOUNTER: Primary | ICD-10-CM

## 2021-10-01 DIAGNOSIS — S46.311A STRAIN OF RIGHT TRICEPS, INITIAL ENCOUNTER: ICD-10-CM

## 2021-10-01 PROCEDURE — 3008F BODY MASS INDEX DOCD: CPT | Performed by: ORTHOPAEDIC SURGERY

## 2021-10-01 PROCEDURE — 99243 OFF/OP CNSLTJ NEW/EST LOW 30: CPT | Performed by: ORTHOPAEDIC SURGERY

## 2021-10-01 RX ORDER — CYCLOBENZAPRINE HCL 10 MG
10 TABLET ORAL 3 TIMES DAILY PRN
Qty: 20 TABLET | Refills: 0 | Status: SHIPPED | OUTPATIENT
Start: 2021-10-01

## 2021-10-01 RX ORDER — NAPROXEN 500 MG/1
500 TABLET ORAL 2 TIMES DAILY WITH MEALS
Qty: 30 TABLET | Refills: 0 | Status: SHIPPED | OUTPATIENT
Start: 2021-10-01

## 2021-10-01 NOTE — H&P
NURSING INTAKE COMMENTS: Patient presents with:  Elbow Pain: right elbow, s/p fall      HPI: This 29year old male presents today for right elbow injury after a fall. Patient sustained an acute injury to his right arm when he fell 2 days ago.   He had [employer]: Male       Review of Systems:  GENERAL: feels generally well, no recent fevers or chills, no significant weight loss or weight gain  SKIN: denies worrisome skin lesions  EYES: Loss of vision in the right eye  HEENT: denies new nasal congestion EFFUSION: Joint effusion is present. OTHER: Negative. CONCLUSION:  1. Slightly impacted fracture of the radial neck. 2. Findings were described by [facility].     Dictated by (CST): German Pérez MD on 9/28/2021 at [facility] [address] [place] [address] was creating using Dragon speech recognition technology. Please excuse any typos.     Basilio Manrique MD

## 2021-10-01 NOTE — PATIENT INSTRUCTIONS
ACE Wrap  Minor muscle or joint injuries are often treated with an elastic bandage. The bandage provides support and compression to the injured area. An elastic bandage is a stretchy, rolled bandage. Elastic bandages range in width from 2 to 6 inches.  Littie Laurabilee time.    Follow-up care  Follow up with your healthcare provider, or as advised.   When to seek medical advice  Call your healthcare provider for any of the following:  · Pain and swelling that doesn't get better or gets worse  · Trouble moving injured area are able to hop and run on the injured leg without pain. Follow-up care  Follow up with your healthcare provider, or as advised.   When to seek medical advice  Call your healthcare provider right away if any of these occur:  · The toes of the injured leg b

## 2021-10-04 DIAGNOSIS — C69.91 MALIGNANT MELANOMA OF RIGHT EYE (HCC): Primary | ICD-10-CM

## 2021-10-06 ENCOUNTER — OFFICE VISIT (OUTPATIENT)
Dept: HEMATOLOGY/ONCOLOGY | Facility: HOSPITAL | Age: 36
End: 2021-10-06
Attending: INTERNAL MEDICINE
Payer: COMMERCIAL

## 2021-10-06 VITALS
BODY MASS INDEX: 32.68 KG/M2 | OXYGEN SATURATION: 98 % | RESPIRATION RATE: 16 BRPM | WEIGHT: 262.81 LBS | HEIGHT: 75 IN | TEMPERATURE: 98 F | HEART RATE: 74 BPM | SYSTOLIC BLOOD PRESSURE: 110 MMHG | DIASTOLIC BLOOD PRESSURE: 71 MMHG

## 2021-10-06 DIAGNOSIS — C69.91 MALIGNANT MELANOMA OF RIGHT EYE (HCC): Primary | ICD-10-CM

## 2021-10-06 DIAGNOSIS — C69.91 MALIGNANT MELANOMA OF RIGHT EYE (HCC): ICD-10-CM

## 2021-10-06 PROCEDURE — 80053 COMPREHEN METABOLIC PANEL: CPT

## 2021-10-06 PROCEDURE — 83615 LACTATE (LD) (LDH) ENZYME: CPT

## 2021-10-06 PROCEDURE — 99213 OFFICE O/P EST LOW 20 MIN: CPT | Performed by: INTERNAL MEDICINE

## 2021-10-06 PROCEDURE — 36415 COLL VENOUS BLD VENIPUNCTURE: CPT

## 2021-10-06 NOTE — PROGRESS NOTES
STELLA     Vicky Drew is a 29year old male here for f/u of Malignant melanoma of right eye (hcc)  (primary encounter diagnosis)     Patient here for f/u. Patient had his most recent ophthalmology evaluation at [address] [place] [address] on 5/3/2021.   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 8 yrs post treatment. LFTs with elevation of AST and ALT noted since March 2021, most recently slightly decreased. Will evaluate with MRI which she is due for.     Given that occular melanoma is Glucose      70 - 99 mg/dL 83 89   Sodium      136 - 145 mmol/L 138 138   Potassium      3.5 - 5.1 mmol/L 4.1 4.0   Chloride      98 - 112 mmol/L 106 105   Carbon Dioxide, Total      21.0 - 32.0 mmol/L 28.0 29.0   ANION GAP      0 - 18 mmol/L 4 4   BUN

## 2021-10-14 ENCOUNTER — TELEPHONE (OUTPATIENT)
Dept: ORTHOPEDICS CLINIC | Facility: CLINIC | Age: 36
End: 2021-10-14

## 2021-10-14 NOTE — TELEPHONE ENCOUNTER
Please let patient know that from a clinical standpoint it is safe and reasonable for him to be seen 3 weeks postop injury. However, if he is insists on being seen at 2 weeks, you can overbook for tomorrow.

## 2021-10-14 NOTE — TELEPHONE ENCOUNTER
Spoke to patient and relayed Dr. Dueñas Arrow message as shown below. Patient verbalized understanding and is ok with being seen on 10/22/21. Patient had no further concerns at this time.

## 2021-10-14 NOTE — TELEPHONE ENCOUNTER
Patient states he was told to return in 2 weeks exactly from Dr. Cristo Chanel which would be today 10/14.  He states doctor even went up to the front and told them to add him on even if \"he is fully booked\" and was still scheduled for the following week charles

## 2021-10-14 NOTE — TELEPHONE ENCOUNTER
Dr. Elisabet Barker, per patient was informed to be seen in 2 weeks for right elbow injury. Seen on 10/1/21. Patient has appointment scheduled on 10/22/21 and is requesting to be seen this week to willie exact 2 week f/u.  Please advise if you would like patient paulino

## 2021-10-22 ENCOUNTER — HOSPITAL ENCOUNTER (OUTPATIENT)
Dept: GENERAL RADIOLOGY | Facility: HOSPITAL | Age: 36
Discharge: HOME | End: 2021-10-22
Attending: ORTHOPAEDIC SURGERY
Payer: COMMERCIAL

## 2021-10-22 ENCOUNTER — OFFICE VISIT (OUTPATIENT)
Dept: ORTHOPEDICS CLINIC | Facility: CLINIC | Age: 36
End: 2021-10-22

## 2021-10-22 VITALS — HEIGHT: 75 IN | WEIGHT: 255 LBS | BODY MASS INDEX: 31.71 KG/M2

## 2021-10-22 DIAGNOSIS — R52 PAIN: ICD-10-CM

## 2021-10-22 DIAGNOSIS — S52.134D CLOSED NONDISPLACED FRACTURE OF NECK OF RIGHT RADIUS WITH ROUTINE HEALING, SUBSEQUENT ENCOUNTER: ICD-10-CM

## 2021-10-22 DIAGNOSIS — S52.134D CLOSED NONDISPLACED FRACTURE OF NECK OF RIGHT RADIUS WITH ROUTINE HEALING, SUBSEQUENT ENCOUNTER: Primary | ICD-10-CM

## 2021-10-22 PROCEDURE — 99212 OFFICE O/P EST SF 10 MIN: CPT | Performed by: ORTHOPAEDIC SURGERY

## 2021-10-22 PROCEDURE — 73080 X-RAY EXAM OF ELBOW: CPT | Performed by: ORTHOPAEDIC SURGERY

## 2021-10-22 PROCEDURE — 73090 X-RAY EXAM OF FOREARM: CPT | Performed by: ORTHOPAEDIC SURGERY

## 2021-10-22 PROCEDURE — 3008F BODY MASS INDEX DOCD: CPT | Performed by: ORTHOPAEDIC SURGERY

## 2021-10-22 NOTE — PROGRESS NOTES
NURSING INTAKE COMMENTS: Patient presents with: Follow - Up: 29year old male is here today f/u R elbow/arm fx. Xray done: 09/28/21. Pain scale: 0/10 at this time. Patient only has mild pain with certain arm movements.  Patient denies taking any pain medic Tobacco Use      Smoking status: Never Smoker      Smokeless tobacco: Never Used    Vaping Use      Vaping Use: Never used    Substance and Sexual Activity      Alcohol use: Yes        Comment: socially (no per NG ECD)      Drug use: No      Sexual activit today were reviewed and demonstrate healing nondisplaced radial neck fracture with early callus formation there is no interval displacement no other osseous injuries of the forearm    Lab Results   Component Value Date    WBC 7.3 03/17/2021    HGB 16.3 03/

## 2021-11-12 ENCOUNTER — OFFICE VISIT (OUTPATIENT)
Dept: ORTHOPEDICS CLINIC | Facility: CLINIC | Age: 36
End: 2021-11-12

## 2021-11-12 ENCOUNTER — HOSPITAL ENCOUNTER (OUTPATIENT)
Dept: GENERAL RADIOLOGY | Facility: HOSPITAL | Age: 36
Discharge: HOME | End: 2021-11-12
Attending: ORTHOPAEDIC SURGERY
Payer: COMMERCIAL

## 2021-11-12 DIAGNOSIS — Z47.89 ORTHOPEDIC AFTERCARE: ICD-10-CM

## 2021-11-12 DIAGNOSIS — S52.134D CLOSED NONDISPLACED FRACTURE OF NECK OF RIGHT RADIUS WITH ROUTINE HEALING, SUBSEQUENT ENCOUNTER: Primary | ICD-10-CM

## 2021-11-12 PROCEDURE — 73070 X-RAY EXAM OF ELBOW: CPT | Performed by: ORTHOPAEDIC SURGERY

## 2021-11-12 PROCEDURE — 99212 OFFICE O/P EST SF 10 MIN: CPT | Performed by: ORTHOPAEDIC SURGERY

## 2021-11-12 NOTE — PROGRESS NOTES
NURSING INTAKE COMMENTS: Patient presents with: Follow - Up: Elbow fracture. Patient stated that he is healing well. Pain scale 3/10      HPI: This 29year old male presents today for follow-up 6 weeks after right elbow radial neck fracture.   Pain has imp Systems:  GENERAL: feels generally well, no recent fevers or chills, no significant weight loss or weight gain  MUSCULOSKELETAL: See HPI  NEURO: See HPI    Physical Examination:    There were no vitals taken for this visit.   General appearance: alert and o Finalized by (CST): Sudha Richardson MD on 10/22/2021 at 7:52 PM             Result Date: 10/22/2021  PROCEDURE: XR ELBOW, COMPLETE (MIN 3 VIEWS), RIGHT (CPT=73080)  COMPARISON: [facility], XR ELBOW, COMPLETE (MIN 3 VIEWS), RIGHT (CPT=7308 7:53 PM             Right elbow x-rays obtained today were reviewed and demonstrate some callus formation around radial neck fracture, fracture line still visible    Lab Results   Component Value Date    WBC 7.3 03/17/2021    HGB 16.3 03/17/2021

## 2021-12-05 ENCOUNTER — HOSPITAL ENCOUNTER (OUTPATIENT)
Dept: MRI IMAGING | Facility: HOSPITAL | Age: 36
Discharge: HOME | End: 2021-12-05
Attending: INTERNAL MEDICINE
Payer: COMMERCIAL

## 2021-12-05 DIAGNOSIS — C69.91 MALIGNANT MELANOMA OF RIGHT EYE (HCC): ICD-10-CM

## 2021-12-05 PROCEDURE — 74183 MRI ABD W/O CNTR FLWD CNTR: CPT | Performed by: INTERNAL MEDICINE

## 2021-12-05 PROCEDURE — A9575 INJ GADOTERATE MEGLUMI 0.1ML: HCPCS | Performed by: INTERNAL MEDICINE

## 2022-01-04 ENCOUNTER — PATIENT MESSAGE (OUTPATIENT)
Dept: INTERNAL MEDICINE CLINIC | Facility: CLINIC | Age: 37
End: 2022-01-04

## 2022-01-04 DIAGNOSIS — S52.124A CLOSED NONDISPLACED FRACTURE OF HEAD OF RIGHT RADIUS, INITIAL ENCOUNTER: Primary | ICD-10-CM

## 2022-01-04 NOTE — TELEPHONE ENCOUNTER
From: Elo Simental  To: Rocio Denise MD  Sent: 1/4/2022 12:10 PM CST  Subject: Do I need an Referal for DR Citlalli Sands    Hi, happy new year to you and your Family   I have to see Dr Citlalli Sands on Friday this week, I tried to call the office and get any

## 2022-01-05 NOTE — TELEPHONE ENCOUNTER
Good Morning Dr Teodoro Granger and staff,    Patient has [facility] HMO plan so will need referral to see Ortho, Dr Tressa Arreola,    Please generate and sign off on referral and then I will work referral.    Thank you,    [facility]

## 2022-01-07 ENCOUNTER — OFFICE VISIT (OUTPATIENT)
Dept: ORTHOPEDICS CLINIC | Facility: CLINIC | Age: 37
End: 2022-01-07
Payer: COMMERCIAL

## 2022-01-07 ENCOUNTER — HOSPITAL ENCOUNTER (OUTPATIENT)
Dept: GENERAL RADIOLOGY | Facility: HOSPITAL | Age: 37
Discharge: HOME | End: 2022-01-07
Attending: ORTHOPAEDIC SURGERY
Payer: COMMERCIAL

## 2022-01-07 DIAGNOSIS — S52.134D CLOSED NONDISPLACED FRACTURE OF NECK OF RIGHT RADIUS WITH ROUTINE HEALING, SUBSEQUENT ENCOUNTER: Primary | ICD-10-CM

## 2022-01-07 DIAGNOSIS — S52.134D CLOSED NONDISPLACED FRACTURE OF NECK OF RIGHT RADIUS WITH ROUTINE HEALING, SUBSEQUENT ENCOUNTER: ICD-10-CM

## 2022-01-07 PROCEDURE — 99212 OFFICE O/P EST SF 10 MIN: CPT | Performed by: ORTHOPAEDIC SURGERY

## 2022-01-07 PROCEDURE — 73070 X-RAY EXAM OF ELBOW: CPT | Performed by: ORTHOPAEDIC SURGERY

## 2022-01-08 NOTE — PROGRESS NOTES
NURSING INTAKE COMMENTS: Patient presents with: Follow - Up: Fracture of right elbow- patient denies any pain or swelling LOV 11/12/2021      HPI: This 29year old male presents today for follow-up right elbow radial neck fracture sustained 12 weeks ago. or weight gain  MUSCULOSKELETAL: See HPI  NEURO: See HPI    Physical Examination:    There were no vitals taken for this visit.   General appearance: alert and oriented, not in acute distress  Vascular: 2+ and symmetric pulses  Skin: intact and benign  Neur radiographically healed radial neck fracture    Plan: Progress back to full activity as tolerated. Follow-up as needed. The above note was creating using Dragon speech recognition technology. Please excuse any typos.     Bisi Orellana MD

## 2022-09-12 NOTE — TELEPHONE ENCOUNTER
Patient is requesting a referral for Ophthalmology. Please call back when approved. He said he wants to schedule something for next week. Spoke to patient and scheduled for 9/13/22 @ 10am.

## 2022-10-05 ENCOUNTER — OFFICE VISIT (OUTPATIENT)
Dept: HEMATOLOGY/ONCOLOGY | Facility: HOSPITAL | Age: 37
End: 2022-10-05
Attending: INTERNAL MEDICINE
Payer: COMMERCIAL

## 2022-10-05 VITALS
OXYGEN SATURATION: 98 % | WEIGHT: 236 LBS | TEMPERATURE: 98 F | DIASTOLIC BLOOD PRESSURE: 79 MMHG | SYSTOLIC BLOOD PRESSURE: 116 MMHG | HEART RATE: 83 BPM | RESPIRATION RATE: 16 BRPM | BODY MASS INDEX: 29.34 KG/M2 | HEIGHT: 75 IN

## 2022-10-05 DIAGNOSIS — Z85.840 HISTORY OF MALIGNANT MELANOMA OF EYE: Primary | ICD-10-CM

## 2022-10-05 DIAGNOSIS — Z85.820 ENCOUNTER FOR FOLLOW-UP SURVEILLANCE OF MELANOMA: ICD-10-CM

## 2022-10-05 DIAGNOSIS — C69.91 MALIGNANT MELANOMA OF RIGHT EYE (HCC): ICD-10-CM

## 2022-10-05 DIAGNOSIS — Z08 ENCOUNTER FOR FOLLOW-UP SURVEILLANCE OF MELANOMA: ICD-10-CM

## 2022-10-05 LAB
ALBUMIN SERPL-MCNC: 4.2 G/DL (ref 3.4–5)
ALBUMIN/GLOB SERPL: 1.1 {RATIO} (ref 1–2)
ALP LIVER SERPL-CCNC: 66 U/L
ALT SERPL-CCNC: 42 U/L
ANION GAP SERPL CALC-SCNC: 7 MMOL/L (ref 0–18)
AST SERPL-CCNC: 19 U/L (ref 15–37)
BILIRUB SERPL-MCNC: 0.4 MG/DL (ref 0.1–2)
BUN BLD-MCNC: 20 MG/DL (ref 7–18)
BUN/CREAT SERPL: 15.3 (ref 10–20)
CALCIUM BLD-MCNC: 9 MG/DL (ref 8.5–10.1)
CHLORIDE SERPL-SCNC: 105 MMOL/L (ref 98–112)
CO2 SERPL-SCNC: 26 MMOL/L (ref 21–32)
CREAT BLD-MCNC: 1.31 MG/DL
GFR SERPLBLD BASED ON 1.73 SQ M-ARVRAT: 72 ML/MIN/1.73M2 (ref 60–?)
GLOBULIN PLAS-MCNC: 3.8 G/DL (ref 2.8–4.4)
GLUCOSE BLD-MCNC: 93 MG/DL (ref 70–99)
LDH SERPL L TO P-CCNC: 137 U/L
OSMOLALITY SERPL CALC.SUM OF ELEC: 288 MOSM/KG (ref 275–295)
POTASSIUM SERPL-SCNC: 4 MMOL/L (ref 3.5–5.1)
PROT SERPL-MCNC: 8 G/DL (ref 6.4–8.2)
SODIUM SERPL-SCNC: 138 MMOL/L (ref 136–145)

## 2022-10-05 PROCEDURE — 80053 COMPREHEN METABOLIC PANEL: CPT

## 2022-10-05 PROCEDURE — 99213 OFFICE O/P EST LOW 20 MIN: CPT | Performed by: INTERNAL MEDICINE

## 2022-10-05 PROCEDURE — 83615 LACTATE (LD) (LDH) ENZYME: CPT

## 2022-10-05 PROCEDURE — 36415 COLL VENOUS BLD VENIPUNCTURE: CPT

## 2023-10-05 DIAGNOSIS — Z85.840 HISTORY OF MALIGNANT MELANOMA OF EYE: Primary | ICD-10-CM

## 2023-10-05 DIAGNOSIS — Z08 ENCOUNTER FOR FOLLOW-UP SURVEILLANCE OF MELANOMA: ICD-10-CM

## 2023-10-05 DIAGNOSIS — Z85.820 ENCOUNTER FOR FOLLOW-UP SURVEILLANCE OF MELANOMA: ICD-10-CM

## 2023-10-11 ENCOUNTER — APPOINTMENT (OUTPATIENT)
Dept: HEMATOLOGY/ONCOLOGY | Facility: HOSPITAL | Age: 38
End: 2023-10-11
Attending: INTERNAL MEDICINE
Payer: COMMERCIAL

## 2024-01-30 DIAGNOSIS — Z08 ENCOUNTER FOR FOLLOW-UP SURVEILLANCE OF MELANOMA: ICD-10-CM

## 2024-01-30 DIAGNOSIS — Z85.840 HISTORY OF MALIGNANT MELANOMA OF EYE: Primary | ICD-10-CM

## 2024-01-30 DIAGNOSIS — Z85.820 ENCOUNTER FOR FOLLOW-UP SURVEILLANCE OF MELANOMA: ICD-10-CM

## 2024-01-31 ENCOUNTER — OFFICE VISIT (OUTPATIENT)
Dept: HEMATOLOGY/ONCOLOGY | Facility: HOSPITAL | Age: 39
End: 2024-01-31
Attending: INTERNAL MEDICINE
Payer: COMMERCIAL

## 2024-01-31 VITALS
BODY MASS INDEX: 28.45 KG/M2 | OXYGEN SATURATION: 97 % | WEIGHT: 228.81 LBS | SYSTOLIC BLOOD PRESSURE: 115 MMHG | HEART RATE: 67 BPM | DIASTOLIC BLOOD PRESSURE: 71 MMHG | RESPIRATION RATE: 18 BRPM | TEMPERATURE: 98 F | HEIGHT: 75 IN

## 2024-01-31 DIAGNOSIS — Z08 ENCOUNTER FOR FOLLOW-UP SURVEILLANCE OF MELANOMA: ICD-10-CM

## 2024-01-31 DIAGNOSIS — Z85.840 HISTORY OF MALIGNANT MELANOMA OF EYE: ICD-10-CM

## 2024-01-31 DIAGNOSIS — Z85.820 ENCOUNTER FOR FOLLOW-UP SURVEILLANCE OF MELANOMA: ICD-10-CM

## 2024-01-31 DIAGNOSIS — C69.91 MALIGNANT MELANOMA OF RIGHT EYE (HCC): Primary | ICD-10-CM

## 2024-01-31 LAB
ALBUMIN SERPL-MCNC: 4.4 G/DL (ref 3.2–4.8)
ALBUMIN/GLOB SERPL: 1.6 {RATIO} (ref 1–2)
ALP LIVER SERPL-CCNC: 60 U/L
ALT SERPL-CCNC: 20 U/L
ANION GAP SERPL CALC-SCNC: 4 MMOL/L (ref 0–18)
AST SERPL-CCNC: 19 U/L (ref ?–34)
BILIRUB SERPL-MCNC: 0.5 MG/DL (ref 0.3–1.2)
BUN BLD-MCNC: 19 MG/DL (ref 9–23)
BUN/CREAT SERPL: 15.7 (ref 10–20)
CALCIUM BLD-MCNC: 9.3 MG/DL (ref 8.7–10.4)
CHLORIDE SERPL-SCNC: 106 MMOL/L (ref 98–112)
CO2 SERPL-SCNC: 29 MMOL/L (ref 21–32)
CREAT BLD-MCNC: 1.21 MG/DL
EGFRCR SERPLBLD CKD-EPI 2021: 79 ML/MIN/1.73M2 (ref 60–?)
GLOBULIN PLAS-MCNC: 2.8 G/DL (ref 2.8–4.4)
GLUCOSE BLD-MCNC: 85 MG/DL (ref 70–99)
LDH SERPL L TO P-CCNC: 129 U/L
OSMOLALITY SERPL CALC.SUM OF ELEC: 290 MOSM/KG (ref 275–295)
POTASSIUM SERPL-SCNC: 4.2 MMOL/L (ref 3.5–5.1)
PROT SERPL-MCNC: 7.2 G/DL (ref 5.7–8.2)
SODIUM SERPL-SCNC: 139 MMOL/L (ref 136–145)

## 2024-01-31 PROCEDURE — 80053 COMPREHEN METABOLIC PANEL: CPT

## 2024-01-31 PROCEDURE — 36415 COLL VENOUS BLD VENIPUNCTURE: CPT

## 2024-01-31 PROCEDURE — 83615 LACTATE (LD) (LDH) ENZYME: CPT

## 2024-01-31 PROCEDURE — 99213 OFFICE O/P EST LOW 20 MIN: CPT | Performed by: NURSE PRACTITIONER

## 2024-01-31 NOTE — PROGRESS NOTES
HPI     Elo Simental is a 38 year old male here for f/u of   Encounter Diagnoses   Name Primary?    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      Patient had his most recent ophthalmology evaluation at [facility] on 09/2023.  He has developed a dense cataract on the right eye, from which there is no benefit from removal as they do not feel there would be significant gain of vision.    He did have a decline in vision in that eye.  L eye vision is good.  Wears protective gear at work.      Denies gait or balance issues. No c/o headaches.    He states his energy is better.  States schedule for work is regular and now able to exercise and better diet and lost weight.  Feels better.     Still working.    Performance status 0.      Review of Systems:   Review of Systems   Constitutional:  Negative for appetite change, fatigue and unexpected weight change.   Respiratory:  Negative for cough and shortness of breath.    Cardiovascular:  Negative for chest pain.   Gastrointestinal:  Negative for abdominal pain.   Musculoskeletal:  Negative for arthralgias and back pain.   Skin:  Negative for rash.        No new suspicious lesions.    Neurological:  Negative for dizziness and headaches.   Hematological:  Negative for adenopathy.   Psychiatric/Behavioral:  Negative for sleep disturbance.            No current outpatient medications on file.     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    Retinal detachment 2013    Macula -on bullous retinal detachment , OD    Torn ACL      Past Surgical History:   Procedure Laterality Date    ELECTROCARDIOGRAM, COMPLETE  09-    Scanned to media tab 09-     Social History     Socioeconomic History    Marital status:    Tobacco Use    Smoking  status: Never    Smokeless tobacco: Never   Vaping Use    Vaping Use: Never used   Substance and Sexual Activity    Alcohol use: Yes     Comment: socially (no per NG ECD)    Drug use: No    Sexual activity: Yes     Partners: Male   Other Topics Concern    Caffeine Concern Yes     Comment: 1 cup coffee daily       Family History   Problem Relation Age of Onset    Migraines Mother     Breast Cancer Maternal Grandmother 68    Glaucoma Neg     Macular degeneration Neg     Diabetes Neg        /71 (BP Location: Left arm, Patient Position: Sitting, Cuff Size: large)   Pulse 67   Temp 97.5 °F (36.4 °C) (Oral)   Resp 18   Ht 1.905 m (6' 3\")   Wt 103.8 kg (228 lb 12.8 oz)   SpO2 97%   BMI 28.60 kg/m²    Wt Readings from Last 6 Encounters:   10/05/22 107 kg (236 lb)   10/22/21 115.7 kg (255 lb)   10/06/21 119.2 kg (262 lb 12.8 oz)   10/01/21 111.1 kg (245 lb)   09/29/21 115.2 kg (254 lb)   09/28/21 112 kg (247 lb)     PHYSICAL EXAM:       General: Patient is alert, not in acute distress.  HEENT: EOMs intact. PERRL. right eye with mild erythema and surgical pupil.    Neck: No JVD. No palpable lymphadenopathy. Neck is supple.  Chest: Clear to auscultation.  Heart: Regular rate and rhythm.   Abdomen: Soft, non tender with good bowel sounds.  Extremities: No edema.  Neurological: Grossly intact.   Lymphatics: There is no palpable lymphadenopathy throughout in the cervical, supraclavicular, axillary, or inguinal regions.  Psych/Depression: nl        ASSESSMENT/PLAN:     Encounter Diagnoses   Name Primary?    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  AJCC staging T2a N0, M0, likely stage IIA.      S/p proton therapy to the melanoma of the OD choroid October 2013 at [place].      Surveillance as per NCCN guidelines.  No specific guidelines available for occular melanoma.      9 yrs  post treatment.      Given that occular melanoma is higher risk than skin, will have MRI of the Abd every year.  Patient overdue for MRI - pt to schedule. Notify of results of MRI.    On surveillance every 12 months.    Dermatology exam at least once a year.  Needs referral to establish care.    F/u with retina specialists in [place] every 12 months, next due August of 2023.    F/u in 12 mo with labs and MRI.       All questions answered to the best of my abilities.  Support provided to the patient.        No orders of the defined types were placed in this encounter.    MDM low.    Results From Past 48 Hours:  Recent Results (from the past 48 hour(s))   COMP METABOLIC PANEL [E]    Collection Time: 01/31/24  2:03 PM   Result Value Ref Range    Glucose 85 70 - 99 mg/dL    Sodium 139 136 - 145 mmol/L    Potassium 4.2 3.5 - 5.1 mmol/L    Chloride 106 98 - 112 mmol/L    CO2 29.0 21.0 - 32.0 mmol/L    Anion Gap 4 0 - 18 mmol/L    BUN 19 9 - 23 mg/dL    Creatinine 1.21 0.70 - 1.30 mg/dL    BUN/CREA Ratio 15.7 10.0 - 20.0    Calcium, Total 9.3 8.7 - 10.4 mg/dL    Calculated Osmolality 290 275 - 295 mOsm/kg    eGFR-Cr 79 >=60 mL/min/1.73m2    ALT 20 10 - 49 U/L    AST 19 <=34 U/L    Alkaline Phosphatase 60 45 - 117 U/L    Bilirubin, Total 0.5 0.3 - 1.2 mg/dL    Total Protein 7.2 5.7 - 8.2 g/dL    Albumin 4.4 3.2 - 4.8 g/dL    Globulin  2.8 2.8 - 4.4 g/dL    A/G Ratio 1.6 1.0 - 2.0    Patient Fasting for CMP? Patient not present    LDH [E]    Collection Time: 01/31/24  2:03 PM   Result Value Ref Range     120 - 246 U/L          Imaging & Referrals:  None   No orders of the defined types were placed in this encounter.     Component      Latest Ref Rng & Units 10/5/2022 10/6/2021   Glucose      70 - 99 mg/dL 93 83   Sodium      136 - 145 mmol/L 138 138   Potassium      3.5 - 5.1 mmol/L 4.0 4.1   Chloride      98 - 112 mmol/L 105 106   Carbon Dioxide, Total      21.0 - 32.0 mmol/L 26.0 28.0   ANION GAP      0 - 18 mmol/L 7  4   BUN      7 - 18 mg/dL 20 (H) 20 (H)   CREATININE      0.70 - 1.30 mg/dL 1.31 (H) 0.91   BUN/CREATININE RATIO      10.0 - 20.0 15.3 22.0 (H)   CALCIUM      8.5 - 10.1 mg/dL 9.0 9.2   CALCULATED OSMOLALITY      275 - 295 mOsm/kg 288 288   eGFR NON-AFR. AMERICAN      >=60  109   eGFR       >=60  126   ALT (SGPT)      16 - 61 U/L 42 95 (H)   AST (SGOT)      15 - 37 U/L 19 46 (H)   ALKALINE PHOSPHATASE      45 - 117 U/L 66 54   Total Bilirubin      0.1 - 2.0 mg/dL 0.4 0.3   PROTEIN, TOTAL      6.4 - 8.2 g/dL 8.0 8.2   Albumin      3.4 - 5.0 g/dL 4.2 4.3   Globulin      2.8 - 4.4 g/dL 3.8 3.9   A/G Ratio      1.0 - 2.0 1.1 1.1   Patient Fasting?        Patient not present   eGFR-Cr      >=60 mL/min/1.73m2 72    Patient Fasting for CMP?       Patient not present    LDH      87 - 241 U/L 137 171

## 2024-03-27 ENCOUNTER — HOSPITAL ENCOUNTER (OUTPATIENT)
Dept: MRI IMAGING | Facility: HOSPITAL | Age: 39
Discharge: HOME | End: 2024-03-27
Attending: INTERNAL MEDICINE
Payer: COMMERCIAL

## 2024-03-27 DIAGNOSIS — Z08 ENCOUNTER FOR FOLLOW-UP SURVEILLANCE OF MELANOMA: ICD-10-CM

## 2024-03-27 DIAGNOSIS — Z85.840 HISTORY OF MALIGNANT MELANOMA OF EYE: ICD-10-CM

## 2024-03-27 DIAGNOSIS — Z85.820 ENCOUNTER FOR FOLLOW-UP SURVEILLANCE OF MELANOMA: ICD-10-CM

## 2024-03-27 PROCEDURE — A9575 INJ GADOTERATE MEGLUMI 0.1ML: HCPCS | Performed by: INTERNAL MEDICINE

## 2024-03-27 PROCEDURE — 74183 MRI ABD W/O CNTR FLWD CNTR: CPT | Performed by: INTERNAL MEDICINE

## 2024-03-27 RX ORDER — GADOTERATE MEGLUMINE 376.9 MG/ML
20 INJECTION INTRAVENOUS
Status: COMPLETED | OUTPATIENT
Start: 2024-03-27 | End: 2024-03-27

## 2024-03-27 RX ADMIN — GADOTERATE MEGLUMINE 20 ML: 376.9 INJECTION INTRAVENOUS at 14:45:00

## 2024-03-29 DIAGNOSIS — Z85.840 HISTORY OF MALIGNANT MELANOMA OF EYE: Primary | ICD-10-CM

## 2024-03-29 DIAGNOSIS — Z08 ENCOUNTER FOR FOLLOW-UP SURVEILLANCE OF MELANOMA: ICD-10-CM

## 2024-03-29 DIAGNOSIS — Z85.820 ENCOUNTER FOR FOLLOW-UP SURVEILLANCE OF MELANOMA: ICD-10-CM

## 2024-03-29 DIAGNOSIS — R93.2 ABNORMAL MRI, LIVER: ICD-10-CM

## 2024-10-05 ENCOUNTER — HOSPITAL ENCOUNTER (OUTPATIENT)
Dept: MRI IMAGING | Facility: HOSPITAL | Age: 39
Discharge: HOME | End: 2024-10-05
Attending: INTERNAL MEDICINE
Payer: COMMERCIAL

## 2024-10-05 DIAGNOSIS — Z85.820 ENCOUNTER FOR FOLLOW-UP SURVEILLANCE OF MELANOMA: ICD-10-CM

## 2024-10-05 DIAGNOSIS — Z08 ENCOUNTER FOR FOLLOW-UP SURVEILLANCE OF MELANOMA: ICD-10-CM

## 2024-10-05 DIAGNOSIS — R93.2 ABNORMAL MRI, LIVER: ICD-10-CM

## 2024-10-05 DIAGNOSIS — Z85.840 HISTORY OF MALIGNANT MELANOMA OF EYE: ICD-10-CM

## 2024-10-05 PROCEDURE — A9575 INJ GADOTERATE MEGLUMI 0.1ML: HCPCS | Performed by: INTERNAL MEDICINE

## 2024-10-05 PROCEDURE — 74183 MRI ABD W/O CNTR FLWD CNTR: CPT | Performed by: INTERNAL MEDICINE

## 2024-10-05 RX ORDER — GADOTERATE MEGLUMINE 376.9 MG/ML
20 INJECTION INTRAVENOUS
Status: COMPLETED | OUTPATIENT
Start: 2024-10-05 | End: 2024-10-05

## 2024-10-05 RX ADMIN — GADOTERATE MEGLUMINE 20 ML: 376.9 INJECTION INTRAVENOUS at 09:36:00

## 2025-01-18 ENCOUNTER — HOSPITAL ENCOUNTER (EMERGENCY)
Facility: HOSPITAL | Age: 40
Discharge: HOME | End: 2025-01-18
Attending: EMERGENCY MEDICINE
Payer: COMMERCIAL

## 2025-01-18 VITALS
BODY MASS INDEX: 28.6 KG/M2 | HEART RATE: 98 BPM | SYSTOLIC BLOOD PRESSURE: 113 MMHG | RESPIRATION RATE: 21 BRPM | HEIGHT: 75 IN | WEIGHT: 230 LBS | TEMPERATURE: 99 F | DIASTOLIC BLOOD PRESSURE: 71 MMHG | OXYGEN SATURATION: 94 %

## 2025-01-18 DIAGNOSIS — R55 SYNCOPE, VASOVAGAL: ICD-10-CM

## 2025-01-18 DIAGNOSIS — R42 ORTHOSTATIC DIZZINESS: Primary | ICD-10-CM

## 2025-01-18 LAB
ANION GAP SERPL CALC-SCNC: 11 MMOL/L (ref 0–18)
BASOPHILS # BLD AUTO: 0.03 X10(3) UL (ref 0–0.2)
BASOPHILS NFR BLD AUTO: 0.4 %
BUN BLD-MCNC: 14 MG/DL (ref 9–23)
BUN/CREAT SERPL: 13.1 (ref 10–20)
CALCIUM BLD-MCNC: 9.6 MG/DL (ref 8.7–10.4)
CHLORIDE SERPL-SCNC: 106 MMOL/L (ref 98–112)
CO2 SERPL-SCNC: 23 MMOL/L (ref 21–32)
CREAT BLD-MCNC: 1.07 MG/DL
DEPRECATED RDW RBC AUTO: 36.5 FL (ref 35.1–46.3)
EGFRCR SERPLBLD CKD-EPI 2021: 91 ML/MIN/1.73M2 (ref 60–?)
EOSINOPHIL # BLD AUTO: 0.13 X10(3) UL (ref 0–0.7)
EOSINOPHIL NFR BLD AUTO: 1.9 %
ERYTHROCYTE [DISTWIDTH] IN BLOOD BY AUTOMATED COUNT: 12.1 % (ref 11–15)
GLUCOSE BLD-MCNC: 80 MG/DL (ref 70–99)
GLUCOSE BLDC GLUCOMTR-MCNC: 97 MG/DL (ref 70–99)
HCT VFR BLD AUTO: 41 %
HGB BLD-MCNC: 14.6 G/DL
IMM GRANULOCYTES # BLD AUTO: 0.02 X10(3) UL (ref 0–1)
IMM GRANULOCYTES NFR BLD: 0.3 %
LYMPHOCYTES # BLD AUTO: 2.71 X10(3) UL (ref 1–4)
LYMPHOCYTES NFR BLD AUTO: 40.1 %
MCH RBC QN AUTO: 29.4 PG (ref 26–34)
MCHC RBC AUTO-ENTMCNC: 35.6 G/DL (ref 31–37)
MCV RBC AUTO: 82.5 FL
MONOCYTES # BLD AUTO: 0.54 X10(3) UL (ref 0.1–1)
MONOCYTES NFR BLD AUTO: 8 %
NEUTROPHILS # BLD AUTO: 3.32 X10 (3) UL (ref 1.5–7.7)
NEUTROPHILS # BLD AUTO: 3.32 X10(3) UL (ref 1.5–7.7)
NEUTROPHILS NFR BLD AUTO: 49.3 %
OSMOLALITY SERPL CALC.SUM OF ELEC: 289 MOSM/KG (ref 275–295)
PLATELET # BLD AUTO: 277 10(3)UL (ref 150–450)
POTASSIUM SERPL-SCNC: 4.1 MMOL/L (ref 3.5–5.1)
RBC # BLD AUTO: 4.97 X10(6)UL
SODIUM SERPL-SCNC: 140 MMOL/L (ref 136–145)
WBC # BLD AUTO: 6.8 X10(3) UL (ref 4–11)

## 2025-01-18 PROCEDURE — 85025 COMPLETE CBC W/AUTO DIFF WBC: CPT | Performed by: EMERGENCY MEDICINE

## 2025-01-18 PROCEDURE — 93005 ELECTROCARDIOGRAM TRACING: CPT

## 2025-01-18 PROCEDURE — 96360 HYDRATION IV INFUSION INIT: CPT

## 2025-01-18 PROCEDURE — 99284 EMERGENCY DEPT VISIT MOD MDM: CPT

## 2025-01-18 PROCEDURE — 82962 GLUCOSE BLOOD TEST: CPT

## 2025-01-18 PROCEDURE — 93010 ELECTROCARDIOGRAM REPORT: CPT

## 2025-01-18 PROCEDURE — 80048 BASIC METABOLIC PNL TOTAL CA: CPT | Performed by: EMERGENCY MEDICINE

## 2025-01-19 LAB
ATRIAL RATE: 66 BPM
P AXIS: 19 DEGREES
P-R INTERVAL: 184 MS
Q-T INTERVAL: 372 MS
QRS DURATION: 96 MS
QTC CALCULATION (BEZET): 389 MS
R AXIS: 55 DEGREES
T AXIS: 35 DEGREES
VENTRICULAR RATE: 66 BPM

## 2025-01-19 NOTE — ED PROVIDER NOTES
Patient Seen in: [facility] Emergency Department    History     Chief Complaint   Patient presents with    Syncope       HPI    History is provided by patient/independent historian: patient, patient's wife  39 year old male with hx of melanoma of the R eye, here with c/o syncopal episode earlier today. He has had episodes of orthostatic dizziness over the last few months, where whenever he gets up from sitting, he has to hold on to something to get his balance. Tonight, per wife, he walked 20 ft and passed out - landing on a couch. No seizure activity. No preceding chest pain/SOB, nausea, diaphoresis. No hx of cardiac arrhythmia. He stays hydrated.     History reviewed.   Past Medical History:    Choroidal malignant melanoma (HCC)    Ocular melanoma (HCC)    Retinal detachment    Macula -on bullous retinal detachment , OD    Torn ACL         History reviewed.   Past Surgical History:   Procedure Laterality Date    Electrocardiogram, complete  09-    Scanned to media tab 09-         Home Medications reviewed :  Prescriptions Prior to Admission[1]      History reviewed.   Social History     Socioeconomic History    Marital status:    Tobacco Use    Smoking status: Never    Smokeless tobacco: Never   Vaping Use    Vaping status: Never Used   Substance and Sexual Activity    Alcohol use: Yes     Comment: socially (no per NG ECD)    Drug use: No    Sexual activity: Yes     Partners: Male   Other Topics Concern    Caffeine Concern Yes     Comment: 1 cup coffee daily         ROS  Review of Systems   Respiratory:  Negative for shortness of breath.    Cardiovascular:  Negative for chest pain.   Neurological:  Positive for syncope.   All other systems reviewed and are negative.     All other pertinent organ systems are reviewed and are negative.      Physical Exam     ED Triage Vitals [01/18/25 2031]   /68   Pulse 67   Resp 14   Temp 98.6 °F (37 °C)   Temp src Oral   SpO2 94 %   O2 Device  None (Room air)     Vital signs reviewed.      Physical Exam  Vitals and nursing note reviewed.   Cardiovascular:      Pulses: Normal pulses.   Pulmonary:      Effort: No respiratory distress.   Abdominal:      General: There is no distension.   Neurological:      Mental Status: He is alert.         ED Course       Labs:     Labs Reviewed   BASIC METABOLIC PANEL (8) - Normal   POCT GLUCOSE - Normal   CBC WITH DIFFERENTIAL WITH PLATELET         My EKG Interpretation: EKG    Rate, intervals and axes as noted on EKG Report.  Rate: 66  Rhythm: Sinus Rhythm  Reading: normal for rate, normal for rhythm, no acute ST changes           As reviewed and Interpreted by me      Imaging Results Available and Reviewed while in ED:   No results found.      Decision rules/scores evaluated: none      Diagnostic labs/tests considered but not ordered: CXR    ED Medications Administered:   Medications   sodium chloride 0.9 % IV bolus 1,000 mL (1,000 mL Intravenous New Bag 1/18/25 8600)            - supportive care discussed    MDM       Medical Decision Making      Differential Diagnosis: After obtaining the patient's history, performing the physical exam and reviewing the diagnostics, multiple initial diagnoses were considered based on the presenting problem including arrhythmia, orthostatic hypotension, hypoglycemia, anemia, RYAN    External document review: I personally reviewed available external medical records for any recent pertinent discharge summaries, testing, and procedures - the findings are as follows: 1/13/24 visit with SAMMIE Whitley  for melanoma f/u    Complicating Factors: The patient already  has a past medical history of Choroidal malignant melanoma (HCC) (2013), Ocular melanoma (HCC) (09/06/2013), Retinal detachment (2013), and Torn ACL. to contribute to the complexity of this ED evaluation.    Procedures performed: none    Discussed management with physician/appropriate source: none    Considered admission/deescalation  of care for: syncope    Social determinants of health affecting patient care: none    Prescription medications considered: discussed continuing current medication regimen    The patient requires continuous monitoring for: syncope    Shared decision making: discussed possible admission      Disposition and Plan     Clinical Impression:  1. Orthostatic dizziness    2. Syncope, vasovagal        Disposition:  Discharge    Follow-up:  Madhu Felix MD  [address] [place] [id number]  [phone]    Follow up        Medications Prescribed:  There are no discharge medications for this patient.                     [1] (Not in a hospital admission)

## 2025-01-19 NOTE — ED INITIAL ASSESSMENT (HPI)
Pt got up from the couch and then all of a sudden he fell down but landed in a chair. Pt reports \"blacking out for 5-10 seconds\". Pt denies cp/sob/headache. Pt denies recent illness. Denies n/v/d. Pt reports \"It always happens at my house. I have a low couch and when I get up I take a few steps and I get dizzy\". Negative FAST.

## 2025-01-29 ENCOUNTER — APPOINTMENT (OUTPATIENT)
Age: 40
End: 2025-01-29
Attending: INTERNAL MEDICINE
Payer: COMMERCIAL

## 2025-04-15 DIAGNOSIS — Z08 ENCOUNTER FOR FOLLOW-UP SURVEILLANCE OF MELANOMA: ICD-10-CM

## 2025-04-15 DIAGNOSIS — Z85.820 ENCOUNTER FOR FOLLOW-UP SURVEILLANCE OF MELANOMA: ICD-10-CM

## 2025-04-15 DIAGNOSIS — Z85.840 HISTORY OF MALIGNANT MELANOMA OF EYE: ICD-10-CM

## 2025-04-16 ENCOUNTER — NURSE ONLY (OUTPATIENT)
Age: 40
End: 2025-04-16
Attending: INTERNAL MEDICINE
Payer: COMMERCIAL

## 2025-04-16 ENCOUNTER — OFFICE VISIT (OUTPATIENT)
Age: 40
End: 2025-04-16
Attending: INTERNAL MEDICINE
Payer: COMMERCIAL

## 2025-04-16 VITALS
OXYGEN SATURATION: 97 % | TEMPERATURE: 97 F | BODY MASS INDEX: 29.22 KG/M2 | WEIGHT: 235 LBS | SYSTOLIC BLOOD PRESSURE: 111 MMHG | DIASTOLIC BLOOD PRESSURE: 68 MMHG | HEIGHT: 75 IN | RESPIRATION RATE: 18 BRPM | HEART RATE: 68 BPM

## 2025-04-16 DIAGNOSIS — Z85.840 HISTORY OF MALIGNANT MELANOMA OF EYE: Primary | ICD-10-CM

## 2025-04-16 DIAGNOSIS — Z85.820 ENCOUNTER FOR FOLLOW-UP SURVEILLANCE OF MELANOMA: ICD-10-CM

## 2025-04-16 DIAGNOSIS — Z85.840 HISTORY OF MALIGNANT MELANOMA OF EYE: ICD-10-CM

## 2025-04-16 DIAGNOSIS — Z08 ENCOUNTER FOR FOLLOW-UP SURVEILLANCE OF MELANOMA: ICD-10-CM

## 2025-04-16 DIAGNOSIS — R93.2 ABNORMAL MRI, LIVER: ICD-10-CM

## 2025-04-16 DIAGNOSIS — C69.91 MALIGNANT MELANOMA OF RIGHT EYE (HCC): ICD-10-CM

## 2025-04-16 LAB
ALBUMIN SERPL-MCNC: 4.5 G/DL (ref 3.2–4.8)
ALBUMIN/GLOB SERPL: 1.7 {RATIO} (ref 1–2)
ALP LIVER SERPL-CCNC: 62 U/L (ref 45–117)
ALT SERPL-CCNC: 25 U/L (ref 10–49)
ANION GAP SERPL CALC-SCNC: 8 MMOL/L (ref 0–18)
AST SERPL-CCNC: 19 U/L (ref ?–34)
BILIRUB SERPL-MCNC: 0.3 MG/DL (ref 0.3–1.2)
BUN BLD-MCNC: 22 MG/DL (ref 9–23)
BUN/CREAT SERPL: 19 (ref 10–20)
CALCIUM BLD-MCNC: 9 MG/DL (ref 8.7–10.4)
CHLORIDE SERPL-SCNC: 104 MMOL/L (ref 98–112)
CO2 SERPL-SCNC: 28 MMOL/L (ref 21–32)
CREAT BLD-MCNC: 1.16 MG/DL (ref 0.7–1.3)
EGFRCR SERPLBLD CKD-EPI 2021: 83 ML/MIN/1.73M2 (ref 60–?)
GLOBULIN PLAS-MCNC: 2.7 G/DL (ref 2–3.5)
GLUCOSE BLD-MCNC: 87 MG/DL (ref 70–99)
LDH SERPL L TO P-CCNC: 158 U/L (ref 120–246)
OSMOLALITY SERPL CALC.SUM OF ELEC: 293 MOSM/KG (ref 275–295)
POTASSIUM SERPL-SCNC: 4 MMOL/L (ref 3.5–5.1)
PROT SERPL-MCNC: 7.2 G/DL (ref 5.7–8.2)
SODIUM SERPL-SCNC: 140 MMOL/L (ref 136–145)

## 2025-04-16 NOTE — PROGRESS NOTES
HPI     Elo Simental is a 39 year old male here for f/u of   Encounter Diagnoses   Name Primary?    History of malignant melanoma of eye Yes    Malignant melanoma of right eye (HCC)     Encounter for follow-up surveillance of melanoma     Abnormal MRI, liver         Patient here for f/u.      Patient had his most recent ophthalmology evaluation at [facility] on 09/2025.  No evidence of disease on ocular exam.    Last MRI of the liver October 2024 with 4 mm lesion recommended in 6 to 12 months which was stable and felt to be secondary to likely a hemangioma.    He did have a decline in vision in that eye.  L eye vision is good.  Wears protective gear at work.      Denies gait or balance issues. No c/o headaches.    He states his energy is good.    Still working.    Performance status 0.      Review of Systems:   Review of Systems   Constitutional:  Negative for appetite change, fatigue and unexpected weight change.   Respiratory:  Negative for cough and shortness of breath.    Cardiovascular:  Negative for chest pain.   Gastrointestinal:  Negative for abdominal pain.   Musculoskeletal:  Negative for arthralgias, back pain and neck pain.   Skin:  Negative for rash.        No new suspicious lesions.    Neurological:  Negative for dizziness and headaches.   Hematological:  Negative for adenopathy.   Psychiatric/Behavioral:  Negative for sleep disturbance.            No current outpatient medications on file.     Allergies:   Allergies   Allergen Reactions    Amoxicillin UNKNOWN       Past Medical History:    Choroidal malignant melanoma (HCC)    Ocular melanoma (HCC)    Retinal detachment    Macula -on bullous retinal detachment , OD    Torn ACL     Past Surgical History:   Procedure Laterality Date    Electrocardiogram, complete  09-    Scanned to media tab 09-     Social History     Socioeconomic History    Marital status:    Tobacco Use    Smoking status: Never    Smokeless tobacco: Never    Vaping Use    Vaping status: Never Used   Substance and Sexual Activity    Alcohol use: Yes     Comment: socially (no per NG ECD)    Drug use: No    Sexual activity: Yes     Partners: Male   Other Topics Concern    Caffeine Concern Yes     Comment: 1 cup coffee daily       Family History   Problem Relation Age of Onset    Migraines Mother     Breast Cancer Maternal Grandmother 68    Glaucoma Neg     Macular degeneration Neg     Diabetes Neg        /68 (BP Location: Left arm, Patient Position: Sitting, Cuff Size: large)   Pulse 68   Temp 97.2 °F (36.2 °C) (Tympanic)   Resp 18   Ht 1.905 m (6' 3\")   Wt 106.6 kg (235 lb)   SpO2 97%   BMI 29.37 kg/m²    Wt Readings from Last 6 Encounters:   04/16/25 106.6 kg (235 lb)   01/18/25 104.3 kg (230 lb)   01/31/24 103.8 kg (228 lb 12.8 oz)   10/05/22 107 kg (236 lb)   10/22/21 115.7 kg (255 lb)   10/06/21 119.2 kg (262 lb 12.8 oz)     PHYSICAL EXAM:       General: Patient is alert, not in acute distress.  HEENT: EOMs intact. PERRL. right eye with mild erythema and surgical pupil.    Neck: No JVD. No palpable lymphadenopathy. Neck is supple.  Chest: Clear to auscultation.  Heart: Regular rate and rhythm.   Abdomen: Soft, non tender with good bowel sounds.  Extremities: No edema.  Neurological: Grossly intact.   Lymphatics: There is no palpable lymphadenopathy throughout in the cervical, supraclavicular, axillary, or inguinal regions.  Psych/Depression: nl        ASSESSMENT/PLAN:     Encounter Diagnoses   Name Primary?    History of malignant melanoma of eye Yes    Malignant melanoma of right eye (HCC)     Encounter for follow-up surveillance of melanoma     Abnormal MRI, liver         Patient is a 39 year old man with diagnosis of choroidal melanoma of the OD.  AJCC staging T2a N0, M0, likely stage IIA.      S/p proton therapy to the melanoma of the OD choroid October 2013 at [place].      Surveillance as per NCCN guidelines.  No specific guidelines available for  occular melanoma.      12 yrs post treatment.      Given that occular melanoma is higher risk than skin, will have MRI of the Abd every year.  Patient overdue for MRI - pt to schedule. Notify of results of MRI.    On surveillance every 12 months.    Dermatology exam at least once a year.  Needs referral to establish care.    F/u with retina specialists in [place] every 12 months, next due March 2026.    F/u in 12 mo with labs and MRI.       All questions answered to the best of my abilities.  Support provided to the patient.        No orders of the defined types were placed in this encounter.    MDM low.    Results From Past 48 Hours:  Recent Results (from the past 48 hours)   COMP METABOLIC PANEL [E]    Collection Time: 04/16/25  2:20 PM   Result Value Ref Range    Glucose 87 70 - 99 mg/dL    Sodium 140 136 - 145 mmol/L    Potassium 4.0 3.5 - 5.1 mmol/L    Chloride 104 98 - 112 mmol/L    CO2 28.0 21.0 - 32.0 mmol/L    Anion Gap 8 0 - 18 mmol/L    BUN 22 9 - 23 mg/dL    Creatinine 1.16 0.70 - 1.30 mg/dL    BUN/CREA Ratio 19.0 10.0 - 20.0    Calcium, Total 9.0 8.7 - 10.4 mg/dL    Calculated Osmolality 293 275 - 295 mOsm/kg    eGFR-Cr 83 >=60 mL/min/1.73m2    ALT 25 10 - 49 U/L    AST 19 <34 U/L    Alkaline Phosphatase 62 45 - 117 U/L    Bilirubin, Total 0.3 0.3 - 1.2 mg/dL    Total Protein 7.2 5.7 - 8.2 g/dL    Albumin 4.5 3.2 - 4.8 g/dL    Globulin  2.7 2.0 - 3.5 g/dL    A/G Ratio 1.7 1.0 - 2.0    Patient Fasting for CMP? Patient not present    LDH [E]    Collection Time: 04/16/25  2:20 PM   Result Value Ref Range     120 - 246 U/L          Imaging & Referrals:  MRI LIVER (W+WO) (CPT=74183)   No orders of the defined types were placed in this encounter.

## (undated) NOTE — LETTER
10/22/2021          To Whom It May Concern:    Elo Simental is currently under my medical care. He may return to work with the following restrictions: Light duty, no lifting greater than 5-10lbs with the right arm.      If you require additional informati

## (undated) NOTE — LETTER
10/1/2021              Elo Simental        [address]         To Whom It May Concern,    Mr. Rosario Tee is currently under my care. He may return to work but may not lift with his right arm or hand.   He will be re-

## (undated) NOTE — MR AVS SNAPSHOT
Elo Simental   2017 5:00 PM   Office Visit   MRN:  [id number]    Description:  Male : 1985   Provider:  Chayo Giordano   Department:  [facility]              Visit Summary      Primary Visit Diagnosis     O discharge instructions in [email or web address] by going to Visits < Admission Summaries. If you've been to the Emergency Department or your doctor's office, you can view your past visit information in [email or web address] by going to Visits < Visit Summaries. [employer] questions?

## (undated) NOTE — MR AVS SNAPSHOT
[address]  [address]  [phone]               Thank you for choosing us for your health care visit with Yany Gonzalez MD.  We are glad to serve you and happy to provide you with this summary office, you can view your past visit information in [facility] by going to Visits < Visit Summaries. [facility] questions? Call [phone] for help. [facility] is NOT to be used for urgent needs. For medical emergencies, dial 911.         Educational Inform